# Patient Record
Sex: MALE | Race: WHITE | Employment: OTHER | URBAN - METROPOLITAN AREA
[De-identification: names, ages, dates, MRNs, and addresses within clinical notes are randomized per-mention and may not be internally consistent; named-entity substitution may affect disease eponyms.]

---

## 2020-11-17 ENCOUNTER — PROCEDURE VISIT (OUTPATIENT)
Dept: FAMILY MEDICINE CLINIC | Age: 65
End: 2020-11-17
Payer: MEDICARE

## 2020-11-17 VITALS
TEMPERATURE: 97.5 F | BODY MASS INDEX: 32.47 KG/M2 | HEIGHT: 73 IN | DIASTOLIC BLOOD PRESSURE: 100 MMHG | SYSTOLIC BLOOD PRESSURE: 164 MMHG | WEIGHT: 245 LBS | HEART RATE: 87 BPM

## 2020-11-17 PROCEDURE — 69210 REMOVE IMPACTED EAR WAX UNI: CPT | Performed by: INTERNAL MEDICINE

## 2020-11-17 SDOH — ECONOMIC STABILITY: FOOD INSECURITY: WITHIN THE PAST 12 MONTHS, YOU WORRIED THAT YOUR FOOD WOULD RUN OUT BEFORE YOU GOT MONEY TO BUY MORE.: NEVER TRUE

## 2020-11-17 SDOH — ECONOMIC STABILITY: INCOME INSECURITY: HOW HARD IS IT FOR YOU TO PAY FOR THE VERY BASICS LIKE FOOD, HOUSING, MEDICAL CARE, AND HEATING?: NOT HARD AT ALL

## 2020-11-17 SDOH — ECONOMIC STABILITY: TRANSPORTATION INSECURITY
IN THE PAST 12 MONTHS, HAS THE LACK OF TRANSPORTATION KEPT YOU FROM MEDICAL APPOINTMENTS OR FROM GETTING MEDICATIONS?: NO

## 2020-11-17 SDOH — ECONOMIC STABILITY: FOOD INSECURITY: WITHIN THE PAST 12 MONTHS, THE FOOD YOU BOUGHT JUST DIDN'T LAST AND YOU DIDN'T HAVE MONEY TO GET MORE.: NEVER TRUE

## 2020-11-17 SDOH — ECONOMIC STABILITY: TRANSPORTATION INSECURITY
IN THE PAST 12 MONTHS, HAS LACK OF TRANSPORTATION KEPT YOU FROM MEETINGS, WORK, OR FROM GETTING THINGS NEEDED FOR DAILY LIVING?: NO

## 2020-11-17 ASSESSMENT — ENCOUNTER SYMPTOMS
DIARRHEA: 0
SORE THROAT: 0
RESPIRATORY NEGATIVE: 1
CONSTIPATION: 0
BLOOD IN STOOL: 0
ABDOMINAL PAIN: 0
NAUSEA: 0

## 2020-11-17 ASSESSMENT — PATIENT HEALTH QUESTIONNAIRE - PHQ9
SUM OF ALL RESPONSES TO PHQ QUESTIONS 1-9: 0
SUM OF ALL RESPONSES TO PHQ9 QUESTIONS 1 & 2: 0
SUM OF ALL RESPONSES TO PHQ QUESTIONS 1-9: 0
1. LITTLE INTEREST OR PLEASURE IN DOING THINGS: 0
SUM OF ALL RESPONSES TO PHQ QUESTIONS 1-9: 0
2. FEELING DOWN, DEPRESSED OR HOPELESS: 0

## 2020-11-17 NOTE — PATIENT INSTRUCTIONS
Survey: You may be receiving a survey from [employer] regarding your visit today. You may get this in the mail, through your MyChart or in your email. Please complete the survey to enable us to provide the highest quality of care to you and your family. Please also, mention our names. If you cannot score us as very good (5 Stars) on any question, please feel free to call the office to discuss how we could have made your experience exceptional.      Thank You! MD Timoteo Vega, [address] [place] [address], [address] [place] [address], [facility]    Gregory Flores CMA      1. Need for influenza vaccination  Lazaro Zepeda was given an influenza vaccine today. - INFLUENZA, QUADV, 3 YRS AND OLDER, IM PF, PREFILL SYR OR SDV, 0.5ML (AFLURIA QUADV, PF)    2. Need for pneumococcal vaccination  Pneumovax 23 given today. - PNEUMOVAX 23 subcutaneous/IM (Pneumococcal polysaccharide vaccine 23-valent >= 1yo)    3. Colon cancer screening  Cologuard Rx given today. - Cologuard (For External Results Only); Future    4. Hearing loss due to cerumen impaction, bilateral  After direct visualization with the otoscope  cerumen impaction was identified in the Bilateral ear/s. The procedure for cerumen removal was discussed with Lazaro Zepeda. The risks were discussed including pain, trauma to the external auditory canal, bleeding, and possible tympanic membrane perforation with loss of hearing. After verbal consent was obtained, Lazaro Zepeda was draped to prevent soiling of his clothing. With the use of a 50 cc syringe fitted with a flexible angiocath,  Bilateral ear/s was irrigated 2 times to displace the cerumen away from the TM. With the use of an otoscope to directly visualize the cerumen a cerumen stylet was used with complete removal of the cerumen. The tympanic membranes appeared normal post procedure.   The external auditory canal appeared normal.  Malvin tolerated the procedure well.  he was instructed to use 2 drops of baby oil in each ear two times a week to prevent ear wax accumulation.    - MO REMOVAL IMPACTED CERUMEN INSTRUMENTATION UNILAT    Return in 2 weeks for BP check.

## 2020-11-17 NOTE — PROGRESS NOTES
Subjective:      Patient ID: Cherylene Cough is a 76 y.o. male. Alfonso Stokes presents for a check up with trouble with hearing. Alfonso Stokes admits to new problems (problems with wax in the ears). Medications were reviewed with Alfonso Stokes, he is not taking medication. Bowels are regular. There has not been rectal bleeding. Alfonso Stokes denies urinary complications, the urine stream is good. Alfonso Stokes denies chest pain and denies increasing shortness of breath.     Past Medical History:  No date: Cardiac murmur unspecified  No date: Esophageal spasm      Comment:  age 44y    Past Surgical History:  No date: TONSILLECTOMY    Social History    Socioeconomic History      Marital status:       Spouse name: Not on file      Number of children: Not on file      Years of education: Not on file      Highest education level: Not on file    Occupational History      Occupation: farmer    Social Needs      Financial resource strain: Not hard at all      Food insecurity        Worry: Never true        Inability: Never true      Transportation needs        Medical: No        Non-medical: No    Tobacco Use      Smoking status: Never Smoker      Smokeless tobacco: Never Used    Substance and Sexual Activity      Alcohol use: No      Drug use: Not on file      Sexual activity: Not on file    Lifestyle      Physical activity        Days per week: Not on file        Minutes per session: Not on file      Stress: Not on file    Relationships      Social connections        Talks on phone: Not on file        Gets together: Not on file        Attends [employer] service: Not on file        Active member of club or organization: Not on file        Attends meetings of clubs or organizations: Not on file        Relationship status: Not on file      Intimate partner violence        Fear of current or ex partner: Not on file        Emotionally abused: Not on file        Physically abused: Not on file        Forced sexual activity: Not on file    Other found for: LABA1C  No results found for: EAG    No results found for: CHOL  No results found for: TRIG  No results found for: HDL  No results found for: LDLCHOLESTEROL, LDLCALC  No results found for: LABVLDL, VLDL  No results found for: CHOLHDLRATIO                      Review of Systems   Constitutional: Negative. HENT: Positive for hearing loss. Negative for congestion, ear pain, postnasal drip, sneezing and sore throat. Eyes: Negative for visual disturbance. Respiratory: Negative. Cardiovascular: Negative for chest pain, palpitations and leg swelling. Gastrointestinal: Negative for abdominal pain, blood in stool, constipation, diarrhea and nausea. Genitourinary: Negative for difficulty urinating, dysuria, frequency and urgency. Musculoskeletal: Negative for arthralgias, joint swelling, myalgias, neck pain and neck stiffness. Skin: Negative. Neurological: Negative for syncope. Psychiatric/Behavioral: Negative. Objective:   Physical Exam  Vitals signs and nursing note reviewed. Constitutional:       Appearance: He is well-developed. HENT:      Head: Atraumatic. Right Ear: There is impacted cerumen. Left Ear: There is impacted cerumen. Eyes:      Conjunctiva/sclera: Conjunctivae normal.   Neck:      Musculoskeletal: Normal range of motion and neck supple. Cardiovascular:      Rate and Rhythm: Normal rate and regular rhythm. Heart sounds: Normal heart sounds. Pulmonary:      Effort: Pulmonary effort is normal.      Breath sounds: Normal breath sounds. Abdominal:      Palpations: Abdomen is soft. Tenderness: There is no abdominal tenderness. Lymphadenopathy:      Cervical: No cervical adenopathy. Skin:     Findings: No rash. Neurological:      Mental Status: He is alert. Psychiatric:         Behavior: Behavior normal.         Thought Content: Thought content normal.         Assessment:       Diagnosis Orders   1.  Hearing loss due to cerumen impaction, bilateral  MD REMOVAL IMPACTED CERUMEN INSTRUMENTATION UNILAT   2. Need for influenza vaccination  INFLUENZA, QUADV, 3 YRS AND OLDER, IM PF, PREFILL SYR OR SDV, 0.5ML (AFLURIA QUADV, PF)   3. Need for pneumococcal vaccination  PNEUMOVAX 23 subcutaneous/IM (Pneumococcal polysaccharide vaccine 23-valent >= 1yo)   4. Colon cancer screening  Cologuard (For External Results Only)           Plan:      1. Need for influenza vaccination  Kennon Brittle was given an influenza vaccine today. - INFLUENZA, QUADV, 3 YRS AND OLDER, IM PF, PREFILL SYR OR SDV, 0.5ML (AFLURIA QUADV, PF)    2. Need for pneumococcal vaccination  Pneumovax 23 given today. - PNEUMOVAX 23 subcutaneous/IM (Pneumococcal polysaccharide vaccine 23-valent >= 1yo)    3. Colon cancer screening  Cologuard Rx given today. - Cologuard (For External Results Only); Future    4. Hearing loss due to cerumen impaction, bilateral  After direct visualization with the otoscope  cerumen impaction was identified in the Bilateral ear/s. The procedure for cerumen removal was discussed with Kennon Brittle. The risks were discussed including pain, trauma to the external auditory canal, bleeding, and possible tympanic membrane perforation with loss of hearing. After verbal consent was obtained, Kennon Brittle was draped to prevent soiling of his clothing. With the use of a 50 cc syringe fitted with a flexible angiocath,  Bilateral ear/s was irrigated 2 times to displace the cerumen away from the TM. With the use of an otoscope to directly visualize the cerumen a cerumen stylet was used with complete removal of the cerumen. The tympanic membranes appeared normal post procedure. The external auditory canal appeared normal.  Malvin tolerated the procedure well.  he was instructed to use 2 drops of baby oil in each ear two times a week to prevent ear wax accumulation.    - MD REMOVAL IMPACTED CERUMEN INSTRUMENTATION UNILAT    Return in 2 weeks for BP check.         Hernan Rose, MD

## 2020-11-18 PROCEDURE — 90732 PPSV23 VACC 2 YRS+ SUBQ/IM: CPT | Performed by: INTERNAL MEDICINE

## 2020-11-18 PROCEDURE — 90686 IIV4 VACC NO PRSV 0.5 ML IM: CPT | Performed by: INTERNAL MEDICINE

## 2020-11-18 PROCEDURE — G0008 ADMIN INFLUENZA VIRUS VAC: HCPCS | Performed by: INTERNAL MEDICINE

## 2020-11-18 PROCEDURE — G0009 ADMIN PNEUMOCOCCAL VACCINE: HCPCS | Performed by: INTERNAL MEDICINE

## 2020-11-18 NOTE — PROGRESS NOTES
After obtaining consent, and per orders of Dr. Glennon Severe, injection of PNU 23 given in Right deltoid by Malcolm Casillas. Patient instructed to remain in clinic for 20 minutes afterwards, and to report any adverse reaction to me immediately.

## 2020-12-01 ENCOUNTER — NURSE ONLY (OUTPATIENT)
Dept: FAMILY MEDICINE CLINIC | Age: 65
End: 2020-12-01

## 2020-12-01 VITALS — SYSTOLIC BLOOD PRESSURE: 130 MMHG | HEART RATE: 80 BPM | DIASTOLIC BLOOD PRESSURE: 70 MMHG

## 2020-12-22 ENCOUNTER — HOSPITAL ENCOUNTER (EMERGENCY)
Age: 65
Discharge: HOME | End: 2020-12-22
Attending: FAMILY MEDICINE
Payer: MEDICARE

## 2020-12-22 VITALS
SYSTOLIC BLOOD PRESSURE: 145 MMHG | TEMPERATURE: 98.5 F | DIASTOLIC BLOOD PRESSURE: 83 MMHG | RESPIRATION RATE: 16 BRPM | BODY MASS INDEX: 31.66 KG/M2 | OXYGEN SATURATION: 96 % | HEART RATE: 97 BPM | WEIGHT: 240 LBS

## 2020-12-22 LAB
ABSOLUTE EOS #: 0 K/UL (ref 0–0.4)
ABSOLUTE IMMATURE GRANULOCYTE: ABNORMAL K/UL (ref 0–0.3)
ABSOLUTE LYMPH #: 0.9 K/UL (ref 1–4.8)
ABSOLUTE MONO #: 0.5 K/UL (ref 0–1)
ANION GAP SERPL CALCULATED.3IONS-SCNC: 11 MMOL/L (ref 9–17)
BASOPHILS # BLD: 0 % (ref 0–2)
BASOPHILS ABSOLUTE: 0 K/UL (ref 0–0.2)
BILIRUBIN URINE: NEGATIVE
BUN BLDV-MCNC: 14 MG/DL (ref 8–23)
BUN/CREAT BLD: 15 (ref 9–20)
CALCIUM SERPL-MCNC: 9.5 MG/DL (ref 8.6–10.4)
CHLORIDE BLD-SCNC: 103 MMOL/L (ref 98–107)
CO2: 25 MMOL/L (ref 20–31)
COLOR: YELLOW
COMMENT UA: ABNORMAL
CREAT SERPL-MCNC: 0.95 MG/DL (ref 0.7–1.2)
DIFFERENTIAL TYPE: YES
EOSINOPHILS RELATIVE PERCENT: 0 % (ref 0–5)
GFR AFRICAN AMERICAN: >60 ML/MIN
GFR NON-AFRICAN AMERICAN: >60 ML/MIN
GFR SERPL CREATININE-BSD FRML MDRD: ABNORMAL ML/MIN/{1.73_M2}
GFR SERPL CREATININE-BSD FRML MDRD: ABNORMAL ML/MIN/{1.73_M2}
GLUCOSE BLD-MCNC: 100 MG/DL (ref 70–99)
GLUCOSE URINE: NEGATIVE
HCT VFR BLD CALC: 41.7 % (ref 41–53)
HEMOGLOBIN: 13.9 G/DL (ref 13.5–17.5)
IMMATURE GRANULOCYTES: ABNORMAL %
KETONES, URINE: ABNORMAL
LEUKOCYTE ESTERASE, URINE: NEGATIVE
LYMPHOCYTES # BLD: 12 % (ref 13–44)
MCH RBC QN AUTO: 30.2 PG (ref 26–34)
MCHC RBC AUTO-ENTMCNC: 33.4 G/DL (ref 31–37)
MCV RBC AUTO: 90.3 FL (ref 80–100)
MONOCYTES # BLD: 7 % (ref 5–9)
NITRITE, URINE: NEGATIVE
NRBC AUTOMATED: ABNORMAL PER 100 WBC
PDW BLD-RTO: 14.6 % (ref 12.1–15.2)
PH UA: 6 (ref 5–8)
PLATELET # BLD: 267 K/UL (ref 140–450)
PLATELET ESTIMATE: ABNORMAL
PMV BLD AUTO: ABNORMAL FL (ref 6–12)
POTASSIUM SERPL-SCNC: 4 MMOL/L (ref 3.7–5.3)
PROTEIN UA: NEGATIVE
RBC # BLD: 4.61 M/UL (ref 4.5–5.9)
RBC # BLD: ABNORMAL 10*6/UL
SEG NEUTROPHILS: 81 % (ref 39–75)
SEGMENTED NEUTROPHILS ABSOLUTE COUNT: 6 K/UL (ref 2.1–6.5)
SODIUM BLD-SCNC: 139 MMOL/L (ref 135–144)
SPECIFIC GRAVITY UA: 1.02 (ref 1–1.03)
TURBIDITY: CLEAR
URINE HGB: NEGATIVE
UROBILINOGEN, URINE: NORMAL
WBC # BLD: 7.5 K/UL (ref 3.5–11)
WBC # BLD: ABNORMAL 10*3/UL

## 2020-12-22 PROCEDURE — 81003 URINALYSIS AUTO W/O SCOPE: CPT

## 2020-12-22 PROCEDURE — 36415 COLL VENOUS BLD VENIPUNCTURE: CPT

## 2020-12-22 PROCEDURE — 80048 BASIC METABOLIC PNL TOTAL CA: CPT

## 2020-12-22 PROCEDURE — 99283 EMERGENCY DEPT VISIT LOW MDM: CPT

## 2020-12-22 PROCEDURE — C9803 HOPD COVID-19 SPEC COLLECT: HCPCS

## 2020-12-22 PROCEDURE — U0003 INFECTIOUS AGENT DETECTION BY NUCLEIC ACID (DNA OR RNA); SEVERE ACUTE RESPIRATORY SYNDROME CORONAVIRUS 2 (SARS-COV-2) (CORONAVIRUS DISEASE [COVID-19]), AMPLIFIED PROBE TECHNIQUE, MAKING USE OF HIGH THROUGHPUT TECHNOLOGIES AS DESCRIBED BY CMS-2020-01-R: HCPCS

## 2020-12-22 PROCEDURE — 85025 COMPLETE CBC W/AUTO DIFF WBC: CPT

## 2020-12-22 ASSESSMENT — ENCOUNTER SYMPTOMS
COUGH: 0
NAUSEA: 0
ABDOMINAL PAIN: 0

## 2020-12-22 NOTE — ED PROVIDER NOTES
[facility] ED  EMERGENCY DEPARTMENT ENCOUNTER      Pt Name: Aleyda Cross  MRN: [id number]  [place] 1955  Date of evaluation: 12/22/2020  Provider: Ana Denise MD    CHIEF COMPLAINT       Chief Complaint   Patient presents with    Insomnia     taking advil PM and not able to sleep - dog got ran over last week and not feeling well since then     Fatigue     been feeling weak in the arms and legs for a week          HISTORY OF PRESENT ILLNESS   (Location/Symptom, Timing/Onset, Context/Setting, Quality, Duration, Modifying Factors, Severity)  Note limiting factors. Aleyda Cross is a 76 y.o. male who presents to the emergency department patient has not felt well for about a week. States that he gets chills but has not had documented fever. No respiratory symptoms. He has had insomnia at night. About a week ago his dog got hit by a car that had anesthetize it. Stated he could not sleep for couple nights because of that. He was going to going to see his regular doctor but they were not available today so he came here. HPI    Nursing Notes were reviewed. REVIEW OF SYSTEMS    (2-9 systems for level 4, 10 or more for level 5)     Review of Systems   Constitutional: Positive for fatigue. Negative for fever. HENT:        Taste intact smell intact   Respiratory: Negative for cough. Gastrointestinal: Negative for abdominal pain and nausea. Genitourinary: Negative for dysuria. Neurological: Positive for weakness. Except as noted above the remainder of the review of systems was reviewed and negative. PAST MEDICAL HISTORY     Past Medical History:   Diagnosis Date    Cardiac murmur unspecified     Esophageal spasm     age 44y         SURGICAL HISTORY       Past Surgical History:   Procedure Laterality Date    TONSILLECTOMY           CURRENT MEDICATIONS       Previous Medications    ASCORBIC ACID (VITAMIN C) 500 MG TABLET    Take 500 mg by mouth daily. 12/22/20 1413 12/22/20 1412 12/22/20 1412 12/22/20 1412 12/22/20 1412 12/22/20 1412 -- 12/22/20 1412   (!) 145/83 98.5 °F (36.9 °C) Oral 97 16 96 %  240 lb (108.9 kg)       Physical Exam  Vitals signs reviewed. Constitutional:       Appearance: He is not ill-appearing. HENT:      Head: Atraumatic. Nose: No congestion. Mouth/Throat:      Mouth: Mucous membranes are moist.      Pharynx: No posterior oropharyngeal erythema. Eyes:      General:         Right eye: No discharge. Left eye: No discharge. Pupils: Pupils are equal, round, and reactive to light. Neck:      Musculoskeletal: Neck supple. No muscular tenderness. Cardiovascular:      Rate and Rhythm: Normal rate and regular rhythm. Heart sounds: Normal heart sounds. No murmur. Pulmonary:      Effort: Pulmonary effort is normal. No respiratory distress. Breath sounds: Normal breath sounds. Abdominal:      General: Abdomen is flat. Palpations: Abdomen is soft. Tenderness: There is no abdominal tenderness. Musculoskeletal:         General: No tenderness or signs of injury. Skin:     General: Skin is warm. Capillary Refill: Capillary refill takes less than 2 seconds. Findings: No lesion or rash. Neurological:      General: No focal deficit present. Mental Status: He is alert and oriented to person, place, and time. Cranial Nerves: No cranial nerve deficit. Motor: No weakness.    Psychiatric:         Mood and Affect: Mood normal.         Behavior: Behavior normal.         DIAGNOSTIC RESULTS     EKG: All EKG's are interpreted by the Emergency Department Physician who either signs or Co-signs this chart in the absence of a cardiologist.        RADIOLOGY:   Non-plain film images such as CT, Ultrasound and MRI are read by the radiologist. Plain radiographic images are visualized and preliminarily interpreted by the emergency physician with the below findings:

## 2020-12-23 ENCOUNTER — TELEPHONE (OUTPATIENT)
Dept: FAMILY MEDICINE CLINIC | Age: 65
End: 2020-12-23

## 2020-12-23 ENCOUNTER — CARE COORDINATION (OUTPATIENT)
Dept: CARE COORDINATION | Age: 65
End: 2020-12-23

## 2020-12-23 NOTE — CARE COORDINATION
This Domenica Carlton contacted the patient by telephone to perform post discharge call. Verified name and  with patient as identifiers. Provided introduction to self, and reason for call due to viral symptoms of infection and/or exposure to COVID-19. Yes       Patient presented to emergency department/flu clinic with complaints of viral symptoms/exposure to COVID. Patient reports symptoms are the same. Due to no new or worsening symptoms the RN CTN/DARLENE was not notified for escalation. Discussed exposure protocols and quarantine with CDC Guidelines    Patient was given an opportunity for questions and concerns. Stay home except to get medical care    Separate yourself from other people and animals in your home    Call ahead before visiting your doctor    Wear a facemask    Cover your coughs and sneezes    Clean your hands often    Avoid sharing personal household items    Clean all high-touch surfaces everyday    Monitor your symptoms  Seek prompt medical attention if your illness is worsening (e.g., difficulty breathing). Before seeking care, call your healthcare provider and tell them that you have, or are being evaluated for, COVID-19. Put on a facemask before you enter the facility. These steps will help the healthcare provider's office to keep other people in the office or waiting room from getting infected or exposed. Ask your healthcare provider to call the local or [facility] health department. Persons who are placed under If you have a medical emergency and need to call 911, notify the dispatch personnel that you have, or are being evaluated for COVID-19. If possible, put on a facemask before emergency medical services arrive. The patient agrees to contact the [facility] exposure line [phone], local health department [place] [facility]: ([phone]) and PCP office for questions related to their healthcare. Author provided contact information for future reference.     Patient  given information for [facility] and agrees to enroll  yes  Patient's preferred e-mail:   Patient's preferred phone number: [phone]  Based on Loop alert triggers, patient will be contacted by nurse care manager for worsening symptoms.

## 2020-12-23 NOTE — CARE COORDINATION
Patient contacted regarding COVID-19 risk and screening. This author contacted the patient by telephone to perform follow-up call. Verified name and  with patient as identifiers. Symptoms reviewed with patient. Patient reports symptoms are the same. Due to no new or worsening symptoms the RN CTN/ACM was not notified for escalation. This author reviewed discharge instructions, medical action plan and red flags such as increased shortness of breath, increasing fever, worsening cough or chest pain with patient who verbalized understanding. Discussed exposure protocols and quarantine with CDC Guidelines    Patient who was given an opportunity for questions and concerns. The patient agrees to contact the [facility] exposure line [phone], local [facility] department [place] [facility]: ([phone])[facility] PCP office for questions related to their healthcare. Author provided contact information for future reference.

## 2020-12-25 LAB — SARS-COV-2, NAA: NOT DETECTED

## 2021-01-04 ENCOUNTER — OFFICE VISIT (OUTPATIENT)
Dept: FAMILY MEDICINE CLINIC | Age: 66
End: 2021-01-04
Payer: MEDICARE

## 2021-01-04 VITALS
TEMPERATURE: 98 F | DIASTOLIC BLOOD PRESSURE: 84 MMHG | HEART RATE: 90 BPM | SYSTOLIC BLOOD PRESSURE: 136 MMHG | BODY MASS INDEX: 31.4 KG/M2 | WEIGHT: 238 LBS

## 2021-01-04 DIAGNOSIS — F41.1 GENERALIZED ANXIETY DISORDER: Primary | ICD-10-CM

## 2021-01-04 DIAGNOSIS — F51.02 ADJUSTMENT INSOMNIA: ICD-10-CM

## 2021-01-04 PROCEDURE — 4040F PNEUMOC VAC/ADMIN/RCVD: CPT | Performed by: INTERNAL MEDICINE

## 2021-01-04 PROCEDURE — G8427 DOCREV CUR MEDS BY ELIG CLIN: HCPCS | Performed by: INTERNAL MEDICINE

## 2021-01-04 PROCEDURE — G8417 CALC BMI ABV UP PARAM F/U: HCPCS | Performed by: INTERNAL MEDICINE

## 2021-01-04 PROCEDURE — 3017F COLORECTAL CA SCREEN DOC REV: CPT | Performed by: INTERNAL MEDICINE

## 2021-01-04 PROCEDURE — 1123F ACP DISCUSS/DSCN MKR DOCD: CPT | Performed by: INTERNAL MEDICINE

## 2021-01-04 PROCEDURE — 1036F TOBACCO NON-USER: CPT | Performed by: INTERNAL MEDICINE

## 2021-01-04 PROCEDURE — 99213 OFFICE O/P EST LOW 20 MIN: CPT | Performed by: INTERNAL MEDICINE

## 2021-01-04 PROCEDURE — G8482 FLU IMMUNIZE ORDER/ADMIN: HCPCS | Performed by: INTERNAL MEDICINE

## 2021-01-04 RX ORDER — SERTRALINE HYDROCHLORIDE 25 MG/1
25 TABLET, FILM COATED ORAL DAILY
Qty: 30 TABLET | Refills: 3 | Status: SHIPPED | OUTPATIENT
Start: 2021-01-04 | End: 2021-05-06 | Stop reason: SDUPTHER

## 2021-01-04 ASSESSMENT — ENCOUNTER SYMPTOMS
RESPIRATORY NEGATIVE: 1
ABDOMINAL PAIN: 0
SORE THROAT: 0
NAUSEA: 0
CONSTIPATION: 0
BLOOD IN STOOL: 0
DIARRHEA: 0

## 2021-01-04 ASSESSMENT — PATIENT HEALTH QUESTIONNAIRE - PHQ9
SUM OF ALL RESPONSES TO PHQ QUESTIONS 1-9: 2
SUM OF ALL RESPONSES TO PHQ QUESTIONS 1-9: 2
1. LITTLE INTEREST OR PLEASURE IN DOING THINGS: 1
SUM OF ALL RESPONSES TO PHQ9 QUESTIONS 1 & 2: 2
SUM OF ALL RESPONSES TO PHQ QUESTIONS 1-9: 2

## 2021-01-04 NOTE — PROGRESS NOTES
Subjective:      Patient ID: Ced Renee is a 76 y.o. male. Ronak Witt complains of not feeling good. He states he is having issues at night, \"it just really gets to me\". Ronak Witt states he has had episodes of this in the past which is usually to chest congestion. Ronak Witt states he went to the ER with these symptoms to include fatigue and a work up was performed (reviewed). COVID was negative. He states he is having trouble sleeping. He has used aleve PM and Melatonin to help him sleep. Ronak Witt is not sure if he is having anxiety. He recently lost a dog that \"tore me up\". This is when the symptoms started. 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 Review of Systems   Constitutional: Positive for fatigue. HENT: Negative for congestion, ear pain, postnasal drip, sneezing and sore throat. Eyes: Negative for visual disturbance. Respiratory: Negative. Cardiovascular: Negative for chest pain, palpitations and leg swelling. Gastrointestinal: Negative for abdominal pain, blood in stool, constipation, diarrhea and nausea. Genitourinary: Negative for difficulty urinating, dysuria, frequency and urgency. Musculoskeletal: Negative for arthralgias, joint swelling, myalgias, neck pain and neck stiffness. Skin: Negative. Neurological: Negative for syncope. Psychiatric/Behavioral: The patient is nervous/anxious. Insomnia       Objective:   Physical Exam  Vitals signs and nursing note reviewed. Constitutional:       Appearance: He is well-developed. HENT:      Head: Atraumatic. Eyes:      Conjunctiva/sclera: Conjunctivae normal.   Neck:      Musculoskeletal: Normal range of motion and neck supple. Cardiovascular:      Rate and Rhythm: Normal rate and regular rhythm. Heart sounds: Normal heart sounds. Pulmonary:      Effort: Pulmonary effort is normal.      Breath sounds: Normal breath sounds.    Abdominal: Palpations: Abdomen is soft. Tenderness: There is no abdominal tenderness. Lymphadenopathy:      Cervical: No cervical adenopathy. Skin:     Findings: No rash. Neurological:      Mental Status: He is alert. Psychiatric:         Behavior: Behavior normal.         Thought Content: Thought content normal.         Assessment:       Diagnosis Orders   1. Generalized anxiety disorder  sertraline (ZOLOFT) 25 MG tablet   2. Adjustment insomnia  sertraline (ZOLOFT) 25 MG tablet             Plan:        1. Generalized anxiety disorder  Take Zoloft 25 mg daily. Return in 1 month for recheck. - sertraline (ZOLOFT) 25 MG tablet; Take 1 tablet by mouth daily  Dispense: 30 tablet; Refill: 3    2. Adjustment insomnia  Should improve once the Zoloft starts working.  - sertraline (ZOLOFT) 25 MG tablet; Take 1 tablet by mouth daily  Dispense: 30 tablet; Refill: 3    Heidy Christensen was instructed to follow up in the clinic in 1 months for follow up on his current condition and the response to change in  Medication.                   Yoli St MD

## 2021-01-04 NOTE — PATIENT INSTRUCTIONS
Survey: You may be receiving a survey from [employer] regarding your visit today. You may get this in the mail, through your MyChart or in your email. Please complete the survey to enable us to provide the highest quality of care to you and your family. Please also, mention our names. If you cannot score us as very good (5 Stars) on any question, please feel free to call the office to discuss how we could have made your experience exceptional.      Thank You! MD Ulices Dial [facility], [address] [place] [address], [address] [place] [address], [facility]    Chiquita Washburn [facility]      1. Generalized anxiety disorder  Take Zoloft 25 mg daily. Return in 1 month for recheck. - sertraline (ZOLOFT) 25 MG tablet; Take 1 tablet by mouth daily  Dispense: 30 tablet; Refill: 3    2. Adjustment insomnia  Should improve once the Zoloft starts working.  - sertraline (ZOLOFT) 25 MG tablet; Take 1 tablet by mouth daily  Dispense: 30 tablet; Refill: 3    Omar Alin was instructed to follow up in the clinic in 1 months for follow up on his current condition and the response to change in  Medication.

## 2021-02-04 ENCOUNTER — OFFICE VISIT (OUTPATIENT)
Dept: FAMILY MEDICINE CLINIC | Age: 66
End: 2021-02-04
Payer: MEDICARE

## 2021-02-04 VITALS
SYSTOLIC BLOOD PRESSURE: 139 MMHG | BODY MASS INDEX: 30.88 KG/M2 | DIASTOLIC BLOOD PRESSURE: 82 MMHG | HEART RATE: 78 BPM | HEIGHT: 73 IN | TEMPERATURE: 97.5 F | WEIGHT: 233 LBS

## 2021-02-04 DIAGNOSIS — F41.1 GENERALIZED ANXIETY DISORDER: Primary | ICD-10-CM

## 2021-02-04 PROCEDURE — 4040F PNEUMOC VAC/ADMIN/RCVD: CPT | Performed by: INTERNAL MEDICINE

## 2021-02-04 PROCEDURE — 3017F COLORECTAL CA SCREEN DOC REV: CPT | Performed by: INTERNAL MEDICINE

## 2021-02-04 PROCEDURE — 1036F TOBACCO NON-USER: CPT | Performed by: INTERNAL MEDICINE

## 2021-02-04 PROCEDURE — 99213 OFFICE O/P EST LOW 20 MIN: CPT | Performed by: INTERNAL MEDICINE

## 2021-02-04 PROCEDURE — G8482 FLU IMMUNIZE ORDER/ADMIN: HCPCS | Performed by: INTERNAL MEDICINE

## 2021-02-04 PROCEDURE — G8417 CALC BMI ABV UP PARAM F/U: HCPCS | Performed by: INTERNAL MEDICINE

## 2021-02-04 PROCEDURE — G8427 DOCREV CUR MEDS BY ELIG CLIN: HCPCS | Performed by: INTERNAL MEDICINE

## 2021-02-04 PROCEDURE — 1123F ACP DISCUSS/DSCN MKR DOCD: CPT | Performed by: INTERNAL MEDICINE

## 2021-02-04 ASSESSMENT — ENCOUNTER SYMPTOMS
RESPIRATORY NEGATIVE: 1
BLOOD IN STOOL: 0
DIARRHEA: 0
ABDOMINAL PAIN: 0
SORE THROAT: 0
CONSTIPATION: 0
NAUSEA: 0

## 2021-02-04 NOTE — PROGRESS NOTES
Sofía Aleman (:  1955) is a 76 y.o. male,Established patient, here for evaluation of the following chief complaint(s):  Mental Health Problem (20 started on zoloft with good results.), Insomnia (sleeping better since starting on zoloft.), and Health Maintenance (cologuard at home)      ASSESSMENT/PLAN:   Diagnosis Orders   1. Generalized anxiety disorder       Plan:  1. Generalized anxiety disorder  Controlled on Zoloft 25 mg daily. Sofía Aleman was instructed to follow up in the clinic in 3 months for check up or as needed with any medical issues. SUBJECTIVE/OBJECTIVE:  Abe Belle was started on Zoloft and states he started to do well within a week. Since then he has continued to improve. He is back to sleeping well. Abe Belle states he is much more relaxed and even finds himself taking naps on occasion. He no longer is feeling over whelmed. Abe Belle was able to get him a blue  pup after loosing his other dog. Review of Systems   Constitutional: Negative. HENT: Negative for congestion, ear pain, postnasal drip, sneezing and sore throat. Eyes: Negative for visual disturbance. Respiratory: Negative. Cardiovascular: Negative for chest pain, palpitations and leg swelling. Gastrointestinal: Negative for abdominal pain, blood in stool, constipation, diarrhea and nausea. Genitourinary: Negative for difficulty urinating, dysuria, frequency and urgency. Musculoskeletal: Negative for arthralgias, joint swelling, myalgias, neck pain and neck stiffness. Skin: Negative. Neurological: Negative for syncope. Psychiatric/Behavioral: Negative. Physical Exam  Vitals signs and nursing note reviewed. Constitutional:       Appearance: He is well-developed. HENT:      Head: Atraumatic. Eyes:      Conjunctiva/sclera: Conjunctivae normal.   Neck:      Musculoskeletal: Normal range of motion and neck supple.    Cardiovascular: Rate and Rhythm: Normal rate and regular rhythm. Heart sounds: Normal heart sounds. Pulmonary:      Effort: Pulmonary effort is normal.      Breath sounds: Normal breath sounds. Abdominal:      Palpations: Abdomen is soft. Tenderness: There is no abdominal tenderness. Lymphadenopathy:      Cervical: No cervical adenopathy. Skin:     Findings: No rash. Neurological:      Mental Status: He is alert. Psychiatric:         Behavior: Behavior normal.         Thought Content: Thought content normal.                 An electronic signature was used to authenticate this note.     --Facundo Wilson MD

## 2021-02-04 NOTE — PATIENT INSTRUCTIONS
Survey: You may be receiving a survey from [employer] regarding your visit today. You may get this in the mail, through your MyChart or in your email. Please complete the survey to enable us to provide the highest quality of care to you and your family. Please also, mention our names. If you cannot score us as very good (5 Stars) on any question, please feel free to call the office to discuss how we could have made your experience exceptional.      Thank You! MD Kian Levy [facility], [address] [place] [address], [address] [place] [address], [facility]    Iker Jenkins CMA      1. Generalized anxiety disorder  Controlled on Zoloft 25 mg daily. Varinder Gamboa was instructed to follow up in the clinic in 3 months for check up or as needed with any medical issues.

## 2021-03-02 ENCOUNTER — OFFICE VISIT (OUTPATIENT)
Dept: FAMILY MEDICINE CLINIC | Age: 66
End: 2021-03-02
Payer: MEDICARE

## 2021-03-02 VITALS
HEIGHT: 73 IN | BODY MASS INDEX: 32.07 KG/M2 | SYSTOLIC BLOOD PRESSURE: 128 MMHG | WEIGHT: 242 LBS | DIASTOLIC BLOOD PRESSURE: 70 MMHG | HEART RATE: 85 BPM

## 2021-03-02 DIAGNOSIS — Z00.00 ROUTINE GENERAL MEDICAL EXAMINATION AT A HEALTH CARE FACILITY: Primary | ICD-10-CM

## 2021-03-02 DIAGNOSIS — Z78.9 FULL CODE STATUS: ICD-10-CM

## 2021-03-02 PROCEDURE — 4040F PNEUMOC VAC/ADMIN/RCVD: CPT | Performed by: INTERNAL MEDICINE

## 2021-03-02 PROCEDURE — G0438 PPPS, INITIAL VISIT: HCPCS | Performed by: INTERNAL MEDICINE

## 2021-03-02 PROCEDURE — 3017F COLORECTAL CA SCREEN DOC REV: CPT | Performed by: INTERNAL MEDICINE

## 2021-03-02 PROCEDURE — 1123F ACP DISCUSS/DSCN MKR DOCD: CPT | Performed by: INTERNAL MEDICINE

## 2021-03-02 PROCEDURE — G8482 FLU IMMUNIZE ORDER/ADMIN: HCPCS | Performed by: INTERNAL MEDICINE

## 2021-03-02 ASSESSMENT — PATIENT HEALTH QUESTIONNAIRE - PHQ9
SUM OF ALL RESPONSES TO PHQ QUESTIONS 1-9: 0
SUM OF ALL RESPONSES TO PHQ9 QUESTIONS 1 & 2: 0
2. FEELING DOWN, DEPRESSED OR HOPELESS: 0
1. LITTLE INTEREST OR PLEASURE IN DOING THINGS: 0
SUM OF ALL RESPONSES TO PHQ QUESTIONS 1-9: 0
SUM OF ALL RESPONSES TO PHQ QUESTIONS 1-9: 0

## 2021-03-02 ASSESSMENT — LIFESTYLE VARIABLES: HOW OFTEN DO YOU HAVE A DRINK CONTAINING ALCOHOL: 0

## 2021-03-02 NOTE — PROGRESS NOTES
Medicare Annual Wellness Visit  Name: Saurabh Zacarias Date: 3/2/2021   MRN: [id number] Sex: Male   Age: 76 y.o. Ethnicity: Non-/Non    : 1955 Race: Deb Beltran is here for Beckie Begum AWV and Health Maintenance (items reviewed)    Screenings for behavioral, psychosocial and functional/safety risks, and cognitive dysfunction are all negative except as indicated below. These results, as well as other patient data from the [address] form, are documented in Flowsheets linked to this Encounter. No Known Allergies    Prior to Visit Medications    Medication Sig Taking? Authorizing Provider   sertraline (ZOLOFT) 25 MG tablet Take 1 tablet by mouth daily  Hernan Rose MD   aspirin EC 81 MG EC tablet Take 1 tablet by mouth daily  Hernan Rose MD   Potassium (POTASSIMIN PO) Take by mouth daily  Historical Provider, MD   therapeutic multivitamin-minerals (THERAGRAN-M) tablet Take 1 tablet by mouth daily. Historical Provider, MD   Ascorbic Acid (VITAMIN C) 500 MG tablet Take 500 mg by mouth daily. Historical Provider, MD [place]-Glucosamine-Vit D (GLUCOSAMINE COMPLEX PO) Take  by mouth. Historical Provider, MD       Past Medical History:   Diagnosis Date    Cardiac murmur unspecified     Esophageal spasm     age 44y       Past Surgical History:   Procedure Laterality Date    TONSILLECTOMY         No family history on file. CareTeam (Including outside providers/suppliers regularly involved in providing care):   Patient Care Team:  Soni Leger MD as PCP - General (Internal Medicine)  Soni Leger MD as PCP - [facility] Empaneled Provider    Wt Readings from Last 3 Encounters:   21 242 lb (109.8 kg)   21 233 lb (105.7 kg)   21 238 lb (108 kg)     Vitals:    21 1300   BP: 128/70   Site: Right Upper Arm   Position: Sitting   Cuff Size: Large Adult   Pulse: 85   Weight: 242 lb (109.8 kg)   Height: 6' 1\" (1.854 m)     Body mass index is 31.93 kg/m². Based upon direct observation of the patient, evaluation of cognition reveals recent and remote memory intact. Patient's complete Health Risk Assessment and screening values have been reviewed and are found in Flowsheets. The following problems were reviewed today and where indicated follow up appointments were made and/or referrals ordered. Positive Risk Factor Screenings with Interventions:          General Health and ACP:  General  In general, how would you say your health is?: Very Good  In the past 7 days, have you experienced any of the following?  New or Increased Pain, New or Increased Fatigue, Loneliness, Social Isolation, Stress or Anger?: None of These  Do you get the social and emotional support that you need?: Yes  Do you have a Living Will?: Yes  Advance Directives     Power of [address] [place] [address] Will ACP-Advance Directive ACP-Power of     Not on File Not on File Not on File Not on File      General Health Risk Interventions:  · no concerns at this time    Health Habits/Nutrition:  Health Habits/Nutrition  Do you exercise for at least 20 minutes 2-3 times per week?: Yes  Have you lost any weight without trying in the past 3 months?: No  Do you eat only one meal per day?: No  Have you seen the dentist within the past year?: (!) No  Body mass index: (!) 31.92  Health Habits/Nutrition Interventions:  · Dental exam overdue:  patient encouraged to make appointment with his/her dentist    Hearing/Vision:  No exam data present  Hearing/Vision  Do you or your family notice any trouble with your hearing that hasn't been managed with hearing aids?: No  Do you have difficulty driving, watching TV, or doing any of your daily activities because of your eyesight?: No  Have you had an eye exam within the past year?: (!) No  Hearing/Vision Interventions:  · Hearing concerns:  recommended to schedule eye exam    Safety:  Safety  Do you have working smoke detectors?: Yes Have all throw rugs been removed or fastened?: (!) No  Do you have non-slip mats or surfaces in all bathtubs/showers?: (!) No  Do all of your stairways have a railing or banister?: Yes  Are your doorways, halls and stairs free of clutter?: Yes  Do you always fasten your seatbelt when you are in a car?: Yes  Safety Interventions:  · Home safety tips provided     Personalized Preventive Plan   Current Health Maintenance Status  Immunization History   Administered Date(s) Administered    Influenza 11/01/2013    Influenza Virus Vaccine 11/25/2014, 10/30/2015    Influenza, Quadv, IM, PF (6 mo and older Fluzone, Flulaval, Fluarix, and 3 yrs and older Afluria) 11/18/2020    Pneumococcal Polysaccharide (Misifexhz65) 11/18/2020    Zoster Live (Zostavax) 01/04/2018        Health Maintenance   Topic Date Due    Hepatitis C screen  Never done    COVID-19 Vaccine (1 of 2) Never done    DTaP/Tdap/Td vaccine (1 - Tdap) Never done    Lipid screen  Never done    Colon cancer screen colonoscopy  Never done    Shingles Vaccine (2 of 3) 03/01/2018    Annual Wellness Visit (AWV)  Never done    Flu vaccine  Completed    Pneumococcal 65+ years Vaccine  Completed    Hepatitis A vaccine  Aged Out    Hepatitis B vaccine  Aged Out    Hib vaccine  Aged Out    Meningococcal (ACWY) vaccine  Aged Out     Recommendations for [employer] Due: see orders and patient instructions/AVS.  . Recommended screening schedule for the next 5-10 years is provided to the patient in written form: see Patient Instructions/AVS.    hTea CHRISTINE LPN, 2[birth year], performed the documented evaluation under the direct supervision of the attending physician. Diagnosis Orders   1. Routine general medical examination at a health care facility     2. Full code status  Full code       1. Routine general medical examination at a health care facility  See above discussion and concerns. This encounter was performed under Hernan cavazos MDs, direct supervision, 3/2/2021. Advance Care Planning     General Advance Care Planning (ACP) Conversation    Date of Conversation: 3/2/2021  Conducted with: Patient with Decision Making Capacity    Healthcare Decision Maker:      Primary Decision Maker: Ricardo Martin - Spouse - [phone]    Secondary Decision Maker: Micaela Monge - Child - [phone]    Click here to complete [address] including selection of the Healthcare Decision Maker Relationship (ie \"Primary\")  Today we documented Decision Maker(s) consistent with ACP documents on file. Content/Action Overview:   Has ACP document(s) NOT on file - requested patient to provide  Reviewed DNR/DNI and patient elects Full Code (Attempt Resuscitation)  Full code  order pended    Length of Voluntary ACP Conversation in minutes:  30 minutes    Radames Vaca

## 2021-03-02 NOTE — PATIENT INSTRUCTIONS
Survey: You may be receiving a survey from [employer] regarding your visit today. You may get this in the mail, through your MyChart or in your email. Please complete the survey to enable us to provide the highest quality of care to you and your family. Please also, mention our names. If you cannot score us as very good (5 Stars) on any question, please feel free to call the office to discuss how we could have made your experience exceptional.      Thank You! MD Mario Alberto Mclain, YINKA Gallardo, VALDEMAR Carrillo RN        Personalized Preventive Plan for Suzy Kamara - 3/2/2021  Medicare offers a range of preventive health benefits. Some of the tests and screenings are paid in full while other may be subject to a deductible, co-insurance, and/or copay. Some of these benefits include a comprehensive review of your medical history including lifestyle, illnesses that may run in your family, and various assessments and screenings as appropriate. After reviewing your medical record and screening and assessments performed today your provider may have ordered immunizations, labs, imaging, and/or referrals for you. A list of these orders (if applicable) as well as your Preventive Care list are included within your After Visit Summary for your review. Other Preventive Recommendations:    · A preventive eye exam performed by an eye specialist is recommended every 1-2 years to screen for glaucoma; cataracts, macular degeneration, and other eye disorders. · A preventive dental visit is recommended every 6 months. · Try to get at least 150 minutes of exercise per week or 10,000 steps per day on a pedometer . · Order or download the FREE \"Exercise & Physical Activity: Your Everyday Guide\" from [employer]. Call 1-762.745.7794 or search [employer] online. · You need 5043-9849 mg of calcium and 4421-0123 IU of vitamin D per day. It is possible to meet your calcium requirement with diet alone, but a vitamin D supplement is usually necessary to meet this goal.  · When exposed to the sun, use a sunscreen that protects against both UVA and UVB radiation with an SPF of 30 or greater. Reapply every 2 to 3 hours or after sweating, drying off with a towel, or swimming. · Always wear a seat belt when traveling in a car. Always wear a helmet when riding a bicycle or motorcycle. Personalized Preventive Plan for Suzy Kamara - 3/2/2021  Medicare offers a range of preventive health benefits. Some of the tests and screenings are paid in full while other may be subject to a deductible, co-insurance, and/or copay. Some of these benefits include a comprehensive review of your medical history including lifestyle, illnesses that may run in your family, and various assessments and screenings as appropriate. After reviewing your medical record and screening and assessments performed today your provider may have ordered immunizations, labs, imaging, and/or referrals for you. A list of these orders (if applicable) as well as your Preventive Care list are included within your After Visit Summary for your review. Other Preventive Recommendations:    A preventive eye exam performed by an eye specialist is recommended every 1-2 years to screen for glaucoma; cataracts, macular degeneration, and other eye disorders. A preventive dental visit is recommended every 6 months. Try to get at least 150 minutes of exercise per week or 10,000 steps per day on a pedometer . Order or download the FREE \"Exercise & Physical Activity: Your Everyday Guide\" from [employer]. Call 9-184.581.1405 or search [employer] online. You need 3470-3271 mg of calcium and 4205-7383 IU of vitamin D per day. It is possible to meet your calcium requirement with diet alone, but a vitamin D supplement is usually necessary to meet this goal.  When exposed to the sun, use a sunscreen that protects against both UVA and UVB radiation with an SPF of 30 or greater. Reapply every 2 to 3 hours or after sweating, drying off with a towel, or swimming. Always wear a seat belt when traveling in a car. Always wear a helmet when riding a bicycle or motorcycle.

## 2021-05-06 DIAGNOSIS — F41.1 GENERALIZED ANXIETY DISORDER: ICD-10-CM

## 2021-05-06 DIAGNOSIS — F51.02 ADJUSTMENT INSOMNIA: ICD-10-CM

## 2021-05-06 RX ORDER — SERTRALINE HYDROCHLORIDE 25 MG/1
25 TABLET, FILM COATED ORAL DAILY
Qty: 30 TABLET | Refills: 3 | Status: SHIPPED | OUTPATIENT
Start: 2021-05-06 | End: 2021-08-31 | Stop reason: SDUPTHER

## 2021-05-06 NOTE — TELEPHONE ENCOUNTER
Health Maintenance   Topic Date Due    Hepatitis C screen  Never done    COVID-19 Vaccine (1) Never done    DTaP/Tdap/Td vaccine (1 - Tdap) Never done    Lipid screen  Never done    Colon cancer screen colonoscopy  Never done    Shingles Vaccine (2 of 3) 03/01/2018    Annual Wellness Visit (AWV)  03/03/2022    Flu vaccine  Completed    Pneumococcal 65+ years Vaccine  Completed    Hepatitis A vaccine  Aged Out    Hepatitis B vaccine  Aged Out    Hib vaccine  Aged Out    Meningococcal (ACWY) vaccine  Aged Out             (applicable per patient's age: Cancer Screenings, Depression Screening, Fall Risk Screening, Immunizations)    AST (U/L)   Date Value   07/25/2015 10     ALT (U/L)   Date Value   07/25/2015 13     BUN (mg/dL)   Date Value   12/22/2020 14      (goal A1C is < 7)   (goal LDL is <100) need 30-50% reduction from baseline     BP Readings from Last 3 Encounters:   03/02/21 128/70   02/04/21 139/82   01/04/21 136/84    (goal /80)      All Future Testing planned in [facility]:  Lab Frequency Next Occurrence   Cologuard (For External Results Only) Once 04/30/2021       Next Visit Date:  Future Appointments   Date Time Provider Ashok Juarez   5/14/2021  9:30 AM [facility]   3/8/2022  9:00 AM [facility]            There is no problem list on file for this patient.

## 2021-05-14 ENCOUNTER — OFFICE VISIT (OUTPATIENT)
Dept: FAMILY MEDICINE CLINIC | Age: 66
End: 2021-05-14
Payer: MEDICARE

## 2021-05-14 VITALS
BODY MASS INDEX: 32.07 KG/M2 | TEMPERATURE: 97.5 F | HEIGHT: 73 IN | SYSTOLIC BLOOD PRESSURE: 128 MMHG | HEART RATE: 67 BPM | WEIGHT: 242 LBS | DIASTOLIC BLOOD PRESSURE: 80 MMHG

## 2021-05-14 DIAGNOSIS — F41.1 GENERALIZED ANXIETY DISORDER: Primary | ICD-10-CM

## 2021-05-14 PROCEDURE — G8427 DOCREV CUR MEDS BY ELIG CLIN: HCPCS | Performed by: INTERNAL MEDICINE

## 2021-05-14 PROCEDURE — 3017F COLORECTAL CA SCREEN DOC REV: CPT | Performed by: INTERNAL MEDICINE

## 2021-05-14 PROCEDURE — 4040F PNEUMOC VAC/ADMIN/RCVD: CPT | Performed by: INTERNAL MEDICINE

## 2021-05-14 PROCEDURE — 1123F ACP DISCUSS/DSCN MKR DOCD: CPT | Performed by: INTERNAL MEDICINE

## 2021-05-14 PROCEDURE — G8417 CALC BMI ABV UP PARAM F/U: HCPCS | Performed by: INTERNAL MEDICINE

## 2021-05-14 PROCEDURE — 1036F TOBACCO NON-USER: CPT | Performed by: INTERNAL MEDICINE

## 2021-05-14 PROCEDURE — 99213 OFFICE O/P EST LOW 20 MIN: CPT | Performed by: INTERNAL MEDICINE

## 2021-05-14 ASSESSMENT — ENCOUNTER SYMPTOMS
DIARRHEA: 0
SORE THROAT: 0
BLOOD IN STOOL: 0
NAUSEA: 0
CONSTIPATION: 0
ABDOMINAL PAIN: 0
RESPIRATORY NEGATIVE: 1

## 2021-05-14 NOTE — PATIENT INSTRUCTIONS
Survey: You may be receiving a survey from [employer] regarding your visit today. You may get this in the mail, through your MyChart or in your email. Please complete the survey to enable us to provide the highest quality of care to you and your family. Please also, mention our names. If you cannot score us as very good (5 Stars) on any question, please feel free to call the office to discuss how we could have made your experience exceptional.      Thank You! MD Luba Stuart, YINKA Gallardo, Jen Escamilla, MARTAN RN    [facility], [address] [place] [address]      1. Generalized anxiety disorder  Well controlled on Zoloft. Pelon Saadia was instructed to follow up in the clinic in 6 months for check up or as needed with any medical issues.

## 2021-05-14 NOTE — PROGRESS NOTES
Gallo Garner (:  1955) is a 76 y.o. male,Established patient, here for evaluation of the following chief complaint(s):  Check-Up (Doing well ) and Health Maintenance (items reviewed, needs labs)      ASSESSMENT/PLAN:  1. Generalized anxiety disorder      Plan:  1. Generalized anxiety disorder  Well controlled on Zoloft. Jeaneth Arriaga was instructed to follow up in the clinic in 6 months for check up or as needed with any medical issues. SUBJECTIVE/OBJECTIVE:  Jeaneth Arriaga presents for a check up on his medical conditions Depression / anxiety. Jeaneth Arriaga denies new problems. Medications were reviewed with Jeaneth Arriaga, he is  tolerating the medication. Bowels are regular. There has not been rectal bleeding. Jeaneth Arriaga denies urinary complications, the urine stream is good. Jeaneth Arriaga denies chest pain and denies increasing shortness of breath. Depression / anxiety is well controlled on the current dose of Zoloft. Jeaneth Arriaga states the 2nd COVID injection caused him to have a lot of symptoms.       Past Medical History:  No date: Cardiac murmur unspecified  No date: Esophageal spasm      Comment:  age 44y    Past Surgical History:  No date: TONSILLECTOMY    Social History    Socioeconomic History      Marital status:       Spouse name: Not on file      Number of children: Not on file      Years of education: Not on file      Highest education level: Not on file    Occupational History      Occupation: farmer    Social Needs      Financial resource strain: Not hard at all      Food insecurity        Worry: Never true        Inability: Never true      Transportation needs        Medical: No        Non-medical: No    Tobacco Use      Smoking status: Never Smoker      Smokeless tobacco: Never Used    Substance and Sexual Activity      Alcohol use: No      Drug use: Not Currently      Sexual activity: Not on file    Lifestyle      Physical activity        Days per week: Not on file        Minutes per session: Not on file      Stress: Not on file    Relationships      Social connections        Talks on phone: Not on file        Gets together: Not on file        Attends Taoism service: Not on file        Active member of club or organization: Not on file        Attends meetings of clubs or organizations: Not on file        Relationship status: Not on file      Intimate partner violence        Fear of current or ex partner: Not on file        Emotionally abused: Not on file        Physically abused: Not on file        Forced sexual activity: Not on file    Other Topics      Concerns:        Not on file    Social History Narrative      Not on file      No family history on file. Current Outpatient Medications on File Prior to Visit:  VITAMIN D PO, Take by mouth, Disp: , Rfl:   sertraline (ZOLOFT) 25 MG tablet, Take 1 tablet by mouth daily, Disp: 30 tablet, Rfl: 3  aspirin EC 81 MG EC tablet, Take 1 tablet by mouth daily, Disp: 30 tablet, Rfl: 3  Potassium (POTASSIMIN PO), Take by mouth daily, Disp: , Rfl:   therapeutic multivitamin-minerals (THERAGRAN-M) tablet, Take 1 tablet by mouth daily. , Disp: , Rfl:   Ascorbic Acid (VITAMIN C) 500 MG tablet, Take 500 mg by mouth daily. , Disp: , Rfl:   Boswellia-Glucosamine-Vit D (GLUCOSAMINE COMPLEX PO), Take  by mouth., Disp: , Rfl:     No current facility-administered medications on file prior to visit.        No Known Allergies      Lab Results       Component                Value               Date                       NA                       139                 12/22/2020                 K                        4.0                 12/22/2020                 CL                       103                 12/22/2020                 CO2                      25                  12/22/2020                 BUN                      14                  12/22/2020                 CREATININE               0.95                12/22/2020                 GLUCOSE 100 (H)             12/22/2020                 CALCIUM                  9.5                 12/22/2020                 PROT                     7.1                 07/25/2015                 LABALBU                  4.3                 07/25/2015                 BILITOT                  0.22 (L)            07/25/2015                 ALKPHOS                  58                  07/25/2015                 AST                      10                  07/25/2015                 ALT                      13                  07/25/2015                 LABGLOM                  >60                 12/22/2020                 GFRAA                    >60                 12/22/2020              No results found for: LABA1C  No results found for: EAG    No results found for: CHOL  No results found for: TRIG  No results found for: HDL  No results found for: LDLCHOLESTEROL, LDLCALC  No results found for: LABVLDL, VLDL  No results found for: CHOLHDLRATIO                      Review of Systems   Constitutional: Negative. HENT: Negative for congestion, ear pain, postnasal drip, sneezing and sore throat. Eyes: Negative for visual disturbance. Respiratory: Negative. Cardiovascular: Negative for chest pain, palpitations and leg swelling. Gastrointestinal: Negative for abdominal pain, blood in stool, constipation, diarrhea and nausea. Genitourinary: Negative for difficulty urinating, dysuria, frequency and urgency. Musculoskeletal: Negative for arthralgias, joint swelling, myalgias, neck pain and neck stiffness. Skin: Negative. Neurological: Negative for syncope. Psychiatric/Behavioral: Negative. Physical Exam  Vitals signs and nursing note reviewed. Constitutional:       Appearance: He is well-developed. HENT:      Head: Atraumatic. Eyes:      Conjunctiva/sclera: Conjunctivae normal.   Neck:      Musculoskeletal: Normal range of motion and neck supple.    Cardiovascular:      Rate and Rhythm: Normal rate and regular rhythm. Heart sounds: Normal heart sounds. Pulmonary:      Effort: Pulmonary effort is normal.      Breath sounds: Normal breath sounds. Abdominal:      Palpations: Abdomen is soft. Tenderness: There is no abdominal tenderness. Lymphadenopathy:      Cervical: No cervical adenopathy. Skin:     Findings: No rash. Neurological:      Mental Status: He is alert. Psychiatric:         Behavior: Behavior normal.         Thought Content: Thought content normal.                 An electronic signature was used to authenticate this note.     --Prema Cronin MD

## 2021-08-31 DIAGNOSIS — F51.02 ADJUSTMENT INSOMNIA: ICD-10-CM

## 2021-08-31 DIAGNOSIS — F41.1 GENERALIZED ANXIETY DISORDER: ICD-10-CM

## 2021-08-31 RX ORDER — SERTRALINE HYDROCHLORIDE 25 MG/1
25 TABLET, FILM COATED ORAL DAILY
Qty: 30 TABLET | Refills: 3 | Status: SHIPPED | OUTPATIENT
Start: 2021-08-31 | End: 2022-05-17 | Stop reason: SDUPTHER

## 2021-08-31 NOTE — TELEPHONE ENCOUNTER
Health Maintenance   Topic Date Due    Hepatitis C screen  Never done    DTaP/Tdap/Td vaccine (1 - Tdap) Never done    Lipid screen  Never done    Colon cancer screen colonoscopy  Never done    Shingles Vaccine (2 of 3) 03/01/2018    Flu vaccine (1) 09/01/2021    Annual Wellness Visit (AWV)  03/03/2022    Pneumococcal 65+ years Vaccine  Completed    COVID-19 Vaccine  Completed    Hepatitis A vaccine  Aged Out    Hepatitis B vaccine  Aged Out    Hib vaccine  Aged Out    Meningococcal (ACWY) vaccine  Aged Out             (applicable per patient's age: Cancer Screenings, Depression Screening, Fall Risk Screening, Immunizations)    AST (U/L)   Date Value   07/25/2015 10     ALT (U/L)   Date Value   07/25/2015 13     BUN (mg/dL)   Date Value   12/22/2020 14      (goal A1C is < 7)   (goal LDL is <100) need 30-50% reduction from baseline     BP Readings from Last 3 Encounters:   05/14/21 128/80   03/02/21 128/70   02/04/21 139/82    (goal /80)      All Future Testing planned in [facility]:      Next Visit Date:  Future Appointments   Date Time Provider Ashok Juarez   11/15/2021 10:00 AM MD Rafita Guzman   3/8/2022  9:00 AM MD Kevin Guzman            Patient Active Problem List:     Generalized anxiety disorder

## 2021-11-15 ENCOUNTER — OFFICE VISIT (OUTPATIENT)
Dept: FAMILY MEDICINE CLINIC | Age: 66
End: 2021-11-15
Payer: MEDICARE

## 2021-11-15 VITALS
BODY MASS INDEX: 33 KG/M2 | HEART RATE: 72 BPM | HEIGHT: 73 IN | WEIGHT: 249 LBS | SYSTOLIC BLOOD PRESSURE: 126 MMHG | DIASTOLIC BLOOD PRESSURE: 86 MMHG

## 2021-11-15 DIAGNOSIS — F41.1 GENERALIZED ANXIETY DISORDER: Primary | ICD-10-CM

## 2021-11-15 DIAGNOSIS — Z23 NEED FOR INFLUENZA VACCINATION: ICD-10-CM

## 2021-11-15 PROCEDURE — 90674 CCIIV4 VAC NO PRSV 0.5 ML IM: CPT | Performed by: INTERNAL MEDICINE

## 2021-11-15 PROCEDURE — G8482 FLU IMMUNIZE ORDER/ADMIN: HCPCS | Performed by: INTERNAL MEDICINE

## 2021-11-15 PROCEDURE — G0008 ADMIN INFLUENZA VIRUS VAC: HCPCS | Performed by: INTERNAL MEDICINE

## 2021-11-15 PROCEDURE — 99213 OFFICE O/P EST LOW 20 MIN: CPT | Performed by: INTERNAL MEDICINE

## 2021-11-15 PROCEDURE — G8417 CALC BMI ABV UP PARAM F/U: HCPCS | Performed by: INTERNAL MEDICINE

## 2021-11-15 PROCEDURE — G8427 DOCREV CUR MEDS BY ELIG CLIN: HCPCS | Performed by: INTERNAL MEDICINE

## 2021-11-15 PROCEDURE — 3017F COLORECTAL CA SCREEN DOC REV: CPT | Performed by: INTERNAL MEDICINE

## 2021-11-15 PROCEDURE — 4040F PNEUMOC VAC/ADMIN/RCVD: CPT | Performed by: INTERNAL MEDICINE

## 2021-11-15 PROCEDURE — 1123F ACP DISCUSS/DSCN MKR DOCD: CPT | Performed by: INTERNAL MEDICINE

## 2021-11-15 PROCEDURE — 1036F TOBACCO NON-USER: CPT | Performed by: INTERNAL MEDICINE

## 2021-11-15 ASSESSMENT — ENCOUNTER SYMPTOMS
NAUSEA: 0
CONSTIPATION: 0
ABDOMINAL PAIN: 0
SORE THROAT: 0
RESPIRATORY NEGATIVE: 1
DIARRHEA: 0
BLOOD IN STOOL: 0

## 2021-11-15 NOTE — PROGRESS NOTES
Yodit Tuttle (:  1955) is a 78 y.o. male,Established patient, here for evaluation of the following chief complaint(s):  Mental Health Problem and Flu Vaccine         ASSESSMENT/PLAN:  1. Generalized anxiety disorder  2. Need for influenza vaccination  -     INFLUENZA, MDCK QUADV, 2 YRS AND OLDER, IM, PF, PREFILL SYR OR SDV, 0.5ML (FLUCELVAX QUADV, PF)      Plan:  1. Need for influenza vaccination  Tyler Lake was given an influenza vaccine today. - INFLUENZA, MDCK QUADV, 2 YRS AND OLDER, IM, PF, PREFILL SYR OR SDV, 0.5ML (FLUCELVAX QUADV, PF)    2. Generalized anxiety disorder  Controlled on Zoloft. Tyler Lake was instructed to follow up in the clinic in 6 months for check up or as needed with any medical issues. Subjective   SUBJECTIVE/OBJECTIVE:  Tyler Lake presents for a check up on his medical conditions Anxiety. Tyler Lake denies new problems. Medications were reviewed with Tyler Lake, he is  tolerating the medication. Bowels are regular. There has not been rectal bleeding. Tyler Lake denies urinary complications, the urine stream is good. Tyler Lake denies chest pain and denies increasing shortness of breath. Chalo Dan feels his anxiety is well controlled on Zoloft. He denies depression.      Past Medical History:  No date: Cardiac murmur unspecified  No date: Esophageal spasm      Comment:  age 44y    Past Surgical History:  No date: TONSILLECTOMY    Social History    Socioeconomic History      Marital status:       Spouse name: Not on file      Number of children: Not on file      Years of education: Not on file      Highest education level: Not on file    Occupational History      Occupation: farmer    Tobacco Use      Smoking status: Never Smoker      Smokeless tobacco: Never Used    Substance and Sexual Activity      Alcohol use: No      Drug use: Not Currently      Sexual activity: Not on file    Other Topics      Concerns:        Not on file    Social History Narrative Not on file    Social Determinants of Health  Financial Resource Strain: Low Risk       Difficulty of Paying Living Expenses: Not hard at all  Food Insecurity: No Food Insecurity      Worried About [facility] in the Last Year: Never true      Ran Out of Food in the Last Year: Never true  Transportation Needs: No Transportation Needs      Lack of Transportation (Medical): No      Lack of Transportation (Non-Medical): No  Physical Activity:       Days of Exercise per Week: Not on file      Minutes of Exercise per Session: Not on file  Stress:       Feeling of Stress : Not on file  Social Connections:       Frequency of Communication with Friends and Family: Not on file      Frequency of Social Gatherings with Friends and Family: Not on file      Attends Hinduism Services: Not on file      Active Member of [employer] or Organizations: Not on file      Attends Club or Organization Meetings: Not on file      Marital Status: Not on file  Intimate Partner Violence:       Fear of Current or Ex-Partner: Not on file      Emotionally Abused: Not on file      Physically Abused: Not on file      Sexually Abused: Not on file  Housing Stability:       Unable to Pay for Housing in the Last Year: Not on file      Number of Places Lived in the Last Year: Not on file      Unstable Housing in the Last Year: Not on file    No family history on file. Current Outpatient Medications on File Prior to Visit:  sertraline (ZOLOFT) 25 MG tablet, Take 1 tablet by mouth daily, Disp: 30 tablet, Rfl: 3  VITAMIN D PO, Take by mouth, Disp: , Rfl:   aspirin EC 81 MG EC tablet, Take 1 tablet by mouth daily, Disp: 30 tablet, Rfl: 3  Potassium (POTASSIMIN PO), Take by mouth daily, Disp: , Rfl:   therapeutic multivitamin-minerals (THERAGRAN-M) tablet, Take 1 tablet by mouth daily. , Disp: , Rfl:   Ascorbic Acid (VITAMIN C) 500 MG tablet, Take 500 mg by mouth daily. , Disp: , Rfl:   Boswellia-Glucosamine-Vit D (GLUCOSAMINE COMPLEX PO), Take  by mouth., Disp: , Rfl:     No current facility-administered medications on file prior to visit. No Known Allergies      Lab Results       Component                Value               Date                       NA                       139                 12/22/2020                 K                        4.0                 12/22/2020                 CL                       103                 12/22/2020                 CO2                      25                  12/22/2020                 BUN                      14                  12/22/2020                 CREATININE               0.95                12/22/2020                 GLUCOSE                  100 (H)             12/22/2020                 CALCIUM                  9.5                 12/22/2020                 PROT                     7.1                 07/25/2015                 LABALBU                  4.3                 07/25/2015                 BILITOT                  0.22 (L)            07/25/2015                 ALKPHOS                  58                  07/25/2015                 AST                      10                  07/25/2015                 ALT                      13                  07/25/2015                 LABGLOM                  >60                 12/22/2020                 GFRAA                    >60                 12/22/2020              No results found for: LABA1C  No results found for: EAG    No results found for: CHOL  No results found for: TRIG  No results found for: HDL  No results found for: LDLCHOLESTEROL, LDLCALC  No results found for: LABVLDL, VLDL  No results found for: CHOLHDLRATIO                      Review of Systems   Constitutional: Negative. HENT: Negative for congestion, ear pain, postnasal drip, sneezing and sore throat. Eyes: Negative for visual disturbance. Respiratory: Negative. Cardiovascular: Negative for chest pain, palpitations and leg swelling.    Gastrointestinal: Negative for abdominal pain, blood in stool, constipation, diarrhea and nausea. Genitourinary: Negative for difficulty urinating, dysuria, frequency and urgency. Musculoskeletal: Negative for arthralgias, joint swelling, myalgias, neck pain and neck stiffness. Skin: Negative. Neurological: Negative for syncope. Psychiatric/Behavioral: Negative. Objective   Physical Exam  Vitals and nursing note reviewed. Constitutional:       Appearance: He is well-developed. HENT:      Head: Atraumatic. Eyes:      Conjunctiva/sclera: Conjunctivae normal.   Cardiovascular:      Rate and Rhythm: Normal rate and regular rhythm. Heart sounds: Normal heart sounds. Pulmonary:      Effort: Pulmonary effort is normal.      Breath sounds: Normal breath sounds. Abdominal:      Palpations: Abdomen is soft. Tenderness: There is no abdominal tenderness. Musculoskeletal:      Cervical back: Normal range of motion and neck supple. Lymphadenopathy:      Cervical: No cervical adenopathy. Skin:     Findings: No rash. Neurological:      Mental Status: He is alert. Psychiatric:         Behavior: Behavior normal.         Thought Content: Thought content normal.                  An electronic signature was used to authenticate this note.     --Crissy Munguia MD

## 2021-11-15 NOTE — PATIENT INSTRUCTIONS
Survey: You may be receiving a survey from [employer] regarding your visit today. You may get this in the mail, through your MyChart or in your email. Please complete the survey to enable us to provide the highest quality of care to you and your family. Please also, mention our names. If you cannot score us as very good (5 Stars) on any question, please feel free to call the office to discuss how we could have made your experience exceptional.      Thank You! MD Liza Davis, YINKA Gallardo, Antwan Leon, MARTAN ABELARDO    [facility], [address] [place] [address]      1. Need for influenza vaccination  Saturnino Phipps was given an influenza vaccine today. - INFLUENZA, MDCK QUADV, 2 YRS AND OLDER, IM, PF, PREFILL SYR OR SDV, 0.5ML (FLUCELVAX QUADV, PF)    2. Generalized anxiety disorder  Controlled on Zoloft. Saturnino Phipps was instructed to follow up in the clinic in 6 months for check up or as needed with any medical issues.

## 2022-03-08 ENCOUNTER — OFFICE VISIT (OUTPATIENT)
Dept: FAMILY MEDICINE CLINIC | Age: 67
End: 2022-03-08
Payer: MEDICARE

## 2022-03-08 VITALS
HEIGHT: 73 IN | HEART RATE: 88 BPM | WEIGHT: 249 LBS | BODY MASS INDEX: 33 KG/M2 | DIASTOLIC BLOOD PRESSURE: 80 MMHG | SYSTOLIC BLOOD PRESSURE: 138 MMHG | OXYGEN SATURATION: 98 %

## 2022-03-08 DIAGNOSIS — Z12.11 COLON CANCER SCREENING: ICD-10-CM

## 2022-03-08 DIAGNOSIS — Z00.00 MEDICARE ANNUAL WELLNESS VISIT, SUBSEQUENT: Primary | ICD-10-CM

## 2022-03-08 PROCEDURE — 1123F ACP DISCUSS/DSCN MKR DOCD: CPT | Performed by: INTERNAL MEDICINE

## 2022-03-08 PROCEDURE — G8482 FLU IMMUNIZE ORDER/ADMIN: HCPCS | Performed by: INTERNAL MEDICINE

## 2022-03-08 PROCEDURE — G0439 PPPS, SUBSEQ VISIT: HCPCS | Performed by: INTERNAL MEDICINE

## 2022-03-08 PROCEDURE — 4040F PNEUMOC VAC/ADMIN/RCVD: CPT | Performed by: INTERNAL MEDICINE

## 2022-03-08 PROCEDURE — 3017F COLORECTAL CA SCREEN DOC REV: CPT | Performed by: INTERNAL MEDICINE

## 2022-03-08 SDOH — ECONOMIC STABILITY: FOOD INSECURITY: WITHIN THE PAST 12 MONTHS, THE FOOD YOU BOUGHT JUST DIDN'T LAST AND YOU DIDN'T HAVE MONEY TO GET MORE.: NEVER TRUE

## 2022-03-08 SDOH — ECONOMIC STABILITY: FOOD INSECURITY: WITHIN THE PAST 12 MONTHS, YOU WORRIED THAT YOUR FOOD WOULD RUN OUT BEFORE YOU GOT MONEY TO BUY MORE.: NEVER TRUE

## 2022-03-08 ASSESSMENT — SOCIAL DETERMINANTS OF HEALTH (SDOH): HOW HARD IS IT FOR YOU TO PAY FOR THE VERY BASICS LIKE FOOD, HOUSING, MEDICAL CARE, AND HEATING?: NOT VERY HARD

## 2022-03-08 ASSESSMENT — PATIENT HEALTH QUESTIONNAIRE - PHQ9
SUM OF ALL RESPONSES TO PHQ QUESTIONS 1-9: 0
SUM OF ALL RESPONSES TO PHQ QUESTIONS 1-9: 0
1. LITTLE INTEREST OR PLEASURE IN DOING THINGS: 0
SUM OF ALL RESPONSES TO PHQ QUESTIONS 1-9: 0
SUM OF ALL RESPONSES TO PHQ QUESTIONS 1-9: 0
2. FEELING DOWN, DEPRESSED OR HOPELESS: 0
SUM OF ALL RESPONSES TO PHQ9 QUESTIONS 1 & 2: 0

## 2022-03-08 ASSESSMENT — LIFESTYLE VARIABLES: HOW OFTEN DO YOU HAVE A DRINK CONTAINING ALCOHOL: NEVER

## 2022-03-08 NOTE — PATIENT INSTRUCTIONS
Personalized Preventive Plan for Hannah Murray - 3/8/2022  Medicare offers a range of preventive health benefits. Some of the tests and screenings are paid in full while other may be subject to a deductible, co-insurance, and/or copay. Some of these benefits include a comprehensive review of your medical history including lifestyle, illnesses that may run in your family, and various assessments and screenings as appropriate. After reviewing your medical record and screening and assessments performed today your provider may have ordered immunizations, labs, imaging, and/or referrals for you. A list of these orders (if applicable) as well as your Preventive Care list are included within your After Visit Summary for your review. Other Preventive Recommendations:    · A preventive eye exam performed by an eye specialist is recommended every 1-2 years to screen for glaucoma; cataracts, macular degeneration, and other eye disorders. · A preventive dental visit is recommended every 6 months. · Try to get at least 150 minutes of exercise per week or 10,000 steps per day on a pedometer . · Order or download the FREE \"Exercise & Physical Activity: Your Everyday Guide\" from [employer]. Call 8-785.563.2705 or search [employer] online. · You need 2878-8566 mg of calcium and 2149-1713 IU of vitamin D per day. It is possible to meet your calcium requirement with diet alone, but a vitamin D supplement is usually necessary to meet this goal.  · When exposed to the sun, use a sunscreen that protects against both UVA and UVB radiation with an SPF of 30 or greater. Reapply every 2 to 3 hours or after sweating, drying off with a towel, or swimming. · Always wear a seat belt when traveling in a car. Always wear a helmet when riding a bicycle or motorcycle.

## 2022-04-01 LAB — NONINV COLON CA DNA+OCC BLD SCRN STL QL: NEGATIVE

## 2022-05-17 ENCOUNTER — OFFICE VISIT (OUTPATIENT)
Dept: FAMILY MEDICINE CLINIC | Age: 67
End: 2022-05-17
Payer: MEDICARE

## 2022-05-17 VITALS
HEART RATE: 66 BPM | SYSTOLIC BLOOD PRESSURE: 130 MMHG | WEIGHT: 249 LBS | BODY MASS INDEX: 33 KG/M2 | HEIGHT: 73 IN | DIASTOLIC BLOOD PRESSURE: 76 MMHG

## 2022-05-17 DIAGNOSIS — F41.1 GENERALIZED ANXIETY DISORDER: ICD-10-CM

## 2022-05-17 DIAGNOSIS — F51.02 ADJUSTMENT INSOMNIA: ICD-10-CM

## 2022-05-17 PROCEDURE — 1123F ACP DISCUSS/DSCN MKR DOCD: CPT | Performed by: INTERNAL MEDICINE

## 2022-05-17 PROCEDURE — G8417 CALC BMI ABV UP PARAM F/U: HCPCS | Performed by: INTERNAL MEDICINE

## 2022-05-17 PROCEDURE — 1036F TOBACCO NON-USER: CPT | Performed by: INTERNAL MEDICINE

## 2022-05-17 PROCEDURE — 4040F PNEUMOC VAC/ADMIN/RCVD: CPT | Performed by: INTERNAL MEDICINE

## 2022-05-17 PROCEDURE — 3017F COLORECTAL CA SCREEN DOC REV: CPT | Performed by: INTERNAL MEDICINE

## 2022-05-17 PROCEDURE — G8427 DOCREV CUR MEDS BY ELIG CLIN: HCPCS | Performed by: INTERNAL MEDICINE

## 2022-05-17 PROCEDURE — 99213 OFFICE O/P EST LOW 20 MIN: CPT | Performed by: INTERNAL MEDICINE

## 2022-05-17 RX ORDER — SERTRALINE HYDROCHLORIDE 25 MG/1
25 TABLET, FILM COATED ORAL DAILY
Qty: 30 TABLET | Refills: 5 | Status: SHIPPED | OUTPATIENT
Start: 2022-05-17

## 2022-05-17 ASSESSMENT — ENCOUNTER SYMPTOMS
DIARRHEA: 0
ABDOMINAL PAIN: 0
SORE THROAT: 0
CONSTIPATION: 0
BLOOD IN STOOL: 0
NAUSEA: 0
RESPIRATORY NEGATIVE: 1

## 2022-05-17 NOTE — PATIENT INSTRUCTIONS
Survey: You may be receiving a survey from [employer] regarding your visit today. You may get this in the mail, through your MyChart or in your email. Please complete the survey to enable us to provide the highest quality of care to you and your family. Please also, mention our names. If you cannot score us as very good (5 Stars) on any question, please feel free to call the office to discuss how we could have made your experience exceptional.      Thank You! MD Kami Fletcher, Paul Moreno, BSN RN    [facility], [address] [place] [address]      1. Generalized anxiety disorder  Controlled on Zoloft. No change in treatment. 2. Adjustment insomnia  Stable on Zoloft. Recommend fasting labs. Kay Haq was instructed to follow up in the clinic in 6 months for check up or as needed with any medical issues.

## 2022-05-17 NOTE — PROGRESS NOTES
Kimberlee Mckinnon (:  1955) is a 78 y.o. male,Established patient, here for evaluation of the following chief complaint(s):  Mental Health Problem (6 month check up, Med refill. )         ASSESSMENT/PLAN:  1. Generalized anxiety disorder  The following orders have not been finalized:  -     sertraline (ZOLOFT) 25 MG tablet  2. Adjustment insomnia  The following orders have not been finalized:  -     sertraline (ZOLOFT) 25 MG tablet      Plan:  1. Generalized anxiety disorder  Controlled on Zoloft. No change in treatment. 2. Adjustment insomnia  Stable on Zoloft. Recommend fasting labs. Diann Hays was instructed to follow up in the clinic in 6 months for check up or as needed with any medical issues. Subjective   SUBJECTIVE/OBJECTIVE:  Diann Hays presents for a check up on his medical conditions Depression / anxiety. Diann Hays denies new problems. Medications were reviewed with Diann Hays, he is  tolerating the medication. Bowels are regular. There has not been rectal bleeding. Diann Hays denies urinary complications, the urine stream is good. Diann Hays denies chest pain and denies increasing shortness of breath. Carol Ansari feels Zoloft is working well at the current dose.       Past Medical History:  No date: Cardiac murmur unspecified  No date: Esophageal spasm      Comment:  age 44y    Past Surgical History:  No date: TONSILLECTOMY    Social History    Socioeconomic History      Marital status:       Spouse name: Not on file      Number of children: Not on file      Years of education: Not on file      Highest education level: Not on file    Occupational History      Occupation: farmer    Tobacco Use      Smoking status: Never Smoker      Smokeless tobacco: Never Used    Substance and Sexual Activity      Alcohol use: No      Drug use: Not Currently      Sexual activity: Not on file    Other Topics      Concerns:        Not on file    Social History Narrative      Not on file    Social Determinants of Health  Financial Resource Strain: Low Risk       Difficulty of Paying Living Expenses: Not very hard  Food Insecurity: No Food Insecurity      Worried About Running Out of Food in the Last Year: Never true      Ran Out of Food in the Last Year: Never true  Transportation Needs:       Lack of Transportation (Medical): Not on file      Lack of Transportation (Non-Medical): Not on file  Physical Activity: Inactive      Days of Exercise per Week: 0 days      Minutes of Exercise per Session: 0 min  Stress:       Feeling of Stress : Not on file  Social Connections:       Frequency of Communication with Friends and Family: Not on file      Frequency of Social Gatherings with Friends and Family: Not on file      Attends Zoroastrianism Services: Not on file      Active Member of [address] [place] [address] or Organizations: Not on file      Attends Club or Organization Meetings: Not on file      Marital Status: Not on file  Intimate Partner Violence:       Fear of Current or Ex-Partner: Not on file      Emotionally Abused: Not on file      Physically Abused: Not on file      Sexually Abused: Not on file  Housing Stability:       Unable to Pay for Housing in the Last Year: Not on file      Number of Places Lived in the Last Year: Not on file      Unstable Housing in the Last Year: Not on file    No family history on file.       Current Outpatient Medications on File Prior to Visit:  Omega-3 Fatty Acids (FISH OIL PO), Take by mouth, Disp: , Rfl:   VITAMIN E PO, Take by mouth, Disp: , Rfl:   ZINC PO, Take by mouth, Disp: , Rfl:   Cyanocobalamin (B-12 PO), Take by mouth, Disp: , Rfl:   sertraline (ZOLOFT) 25 MG tablet, Take 1 tablet by mouth daily, Disp: 30 tablet, Rfl: 3  VITAMIN D PO, Take by mouth, Disp: , Rfl:   aspirin EC 81 MG EC tablet, Take 1 tablet by mouth daily, Disp: 30 tablet, Rfl: 3  Potassium (POTASSIMIN PO), Take by mouth daily, Disp: , Rfl:   therapeutic multivitamin-minerals (THERAGRAN-M) tablet, Take 1 tablet by mouth daily. , Disp: , Rfl:   Ascorbic Acid (VITAMIN C) 500 MG tablet, Take 500 mg by mouth daily. , Disp: , Rfl:   Boswellia-Glucosamine-Vit D (GLUCOSAMINE COMPLEX PO), Take  by mouth., Disp: , Rfl:     No current facility-administered medications on file prior to visit. No Known Allergies      Lab Results       Component                Value               Date                       NA                       139                 12/22/2020                 K                        4.0                 12/22/2020                 CL                       103                 12/22/2020                 CO2                      25                  12/22/2020                 BUN                      14                  12/22/2020                 CREATININE               0.95                12/22/2020                 GLUCOSE                  100 (H)             12/22/2020                 CALCIUM                  9.5                 12/22/2020                 PROT                     7.1                 07/25/2015                 LABALBU                  4.3                 07/25/2015                 BILITOT                  0.22 (L)            07/25/2015                 ALKPHOS                  58                  07/25/2015                 AST                      10                  07/25/2015                 ALT                      13                  07/25/2015                 LABGLOM                  >60                 12/22/2020                 GFRAA                    >60                 12/22/2020              No results found for: LABA1C  No results found for: EAG    No results found for: CHOL  No results found for: TRIG  No results found for: HDL  No results found for: LDLCHOLESTEROL, LDLCALC  No results found for: LABVLDL, VLDL  No results found for: CHOLHDLRATIO                      Review of Systems   Constitutional: Negative.     HENT: Negative for congestion, ear pain, postnasal drip, sneezing and sore throat. Eyes: Negative for visual disturbance. Respiratory: Negative. Cardiovascular: Negative for chest pain, palpitations and leg swelling. Gastrointestinal: Negative for abdominal pain, blood in stool, constipation, diarrhea and nausea. Genitourinary: Negative for difficulty urinating, dysuria, frequency and urgency. Musculoskeletal: Negative for arthralgias, joint swelling, myalgias, neck pain and neck stiffness. Skin: Negative. Neurological: Negative for syncope. Psychiatric/Behavioral: Negative. Objective   Physical Exam  Vitals and nursing note reviewed. Constitutional:       Appearance: He is well-developed. HENT:      Head: Atraumatic. Eyes:      Conjunctiva/sclera: Conjunctivae normal.   Cardiovascular:      Rate and Rhythm: Normal rate and regular rhythm. Heart sounds: Normal heart sounds. Pulmonary:      Effort: Pulmonary effort is normal.      Breath sounds: Normal breath sounds. Abdominal:      Palpations: Abdomen is soft. Tenderness: There is no abdominal tenderness. Musculoskeletal:      Cervical back: Normal range of motion and neck supple. Lymphadenopathy:      Cervical: No cervical adenopathy. Skin:     Findings: No rash. Neurological:      Mental Status: He is alert. Psychiatric:         Behavior: Behavior normal.         Thought Content: Thought content normal.                  An electronic signature was used to authenticate this note.     --Kennedi Richey MD

## 2022-11-22 DIAGNOSIS — F41.1 GENERALIZED ANXIETY DISORDER: ICD-10-CM

## 2022-11-22 DIAGNOSIS — F51.02 ADJUSTMENT INSOMNIA: ICD-10-CM

## 2022-11-22 RX ORDER — SERTRALINE HYDROCHLORIDE 25 MG/1
25 TABLET, FILM COATED ORAL DAILY
Qty: 90 TABLET | Refills: 1 | Status: SHIPPED | OUTPATIENT
Start: 2022-11-22

## 2022-12-05 ENCOUNTER — OFFICE VISIT (OUTPATIENT)
Dept: FAMILY MEDICINE CLINIC | Age: 67
End: 2022-12-05
Payer: MEDICARE

## 2022-12-05 VITALS
HEIGHT: 73 IN | SYSTOLIC BLOOD PRESSURE: 136 MMHG | WEIGHT: 255 LBS | BODY MASS INDEX: 33.8 KG/M2 | HEART RATE: 76 BPM | DIASTOLIC BLOOD PRESSURE: 80 MMHG

## 2022-12-05 DIAGNOSIS — F41.1 GENERALIZED ANXIETY DISORDER: Primary | ICD-10-CM

## 2022-12-05 PROCEDURE — G8427 DOCREV CUR MEDS BY ELIG CLIN: HCPCS | Performed by: INTERNAL MEDICINE

## 2022-12-05 PROCEDURE — 3017F COLORECTAL CA SCREEN DOC REV: CPT | Performed by: INTERNAL MEDICINE

## 2022-12-05 PROCEDURE — 1036F TOBACCO NON-USER: CPT | Performed by: INTERNAL MEDICINE

## 2022-12-05 PROCEDURE — G8417 CALC BMI ABV UP PARAM F/U: HCPCS | Performed by: INTERNAL MEDICINE

## 2022-12-05 PROCEDURE — 1123F ACP DISCUSS/DSCN MKR DOCD: CPT | Performed by: INTERNAL MEDICINE

## 2022-12-05 PROCEDURE — 99213 OFFICE O/P EST LOW 20 MIN: CPT | Performed by: INTERNAL MEDICINE

## 2022-12-05 PROCEDURE — G8484 FLU IMMUNIZE NO ADMIN: HCPCS | Performed by: INTERNAL MEDICINE

## 2022-12-05 ASSESSMENT — ENCOUNTER SYMPTOMS
SORE THROAT: 0
RESPIRATORY NEGATIVE: 1
NAUSEA: 0
DIARRHEA: 0
CONSTIPATION: 0
BLOOD IN STOOL: 0
ABDOMINAL PAIN: 0

## 2022-12-05 NOTE — PATIENT INSTRUCTIONS
Survey: You may be receiving a survey from [employer] regarding your visit today. You may get this in the mail, through your MyChart or in your email. Please complete the survey to enable us to provide the highest quality of care to you and your family. Please also, mention our names. If you cannot score us as very good (5 Stars) on any question, please feel free to call the office to discuss how we could have made your experience exceptional.      Thank You! MD Renny Davis [facility], [address] [place] [address], Kingman Colin, BSN ABELARDO Franklin     Plan:  1. Generalized anxiety disorder  Doing well on Zoloft. No change in medication. Recommend fasting labs at the wellness fair. Saurabh Iniguez was instructed to follow up in the clinic in 6 months for check up or as needed with any medical issues.

## 2022-12-05 NOTE — PROGRESS NOTES
Sade Valdez (:  1955) is a 75 y.o. male,Established patient, here for evaluation of the following chief complaint(s):  Mental Health Problem (6 month check up. )         ASSESSMENT/PLAN:  1. Generalized anxiety disorder      Plan:  1. Generalized anxiety disorder  Doing well on Zoloft. No change in medication. Recommend fasting labs at the wellness fair. Mia Layton was instructed to follow up in the clinic in 6 months for check up or as needed with any medical issues. Subjective   SUBJECTIVE/OBJECTIVE:  Mia Layton presents for a check up on his medical conditions Anxiety. Mia Layton denies new problems. Medications were reviewed with Mia Layton, he is  tolerating the medication. Bowels are regular. There has not been rectal bleeding. Mia Layton denies urinary complications, the urine stream is good. Mia Layton denies chest pain and denies increasing shortness of breath. Anxiety has been well controlled on the current dose of Zoloft. Does not feel he needs any change in medication.      Past Medical History:  No date: Cardiac murmur unspecified  No date: Esophageal spasm      Comment:  age 44y    Past Surgical History:  No date: TONSILLECTOMY    Social History    Socioeconomic History      Marital status:       Spouse name: Not on file      Number of children: Not on file      Years of education: Not on file      Highest education level: Not on file    Occupational History      Occupation: farmer    Tobacco Use      Smoking status: Never      Smokeless tobacco: Never    Substance and Sexual Activity      Alcohol use: No      Drug use: Not Currently      Sexual activity: Not on file    Other Topics      Concerns:        Not on file    Social History Narrative      Not on file    Social Determinants of Health  Financial Resource Strain: Low Risk       Difficulty of Paying Living Expenses: Not very hard  Food Insecurity: No Food Insecurity      Worried About Running Out of Food in the Last Year: Never true      Ran Out of Food in the Last Year: Never true  Transportation Needs: Not on file  Physical Activity: Inactive      Days of Exercise per Week: 0 days      Minutes of Exercise per Session: 0 min  Stress: Not on file  Social Connections: Not on file  Intimate Partner Violence: Not on file  Housing Stability: Not on file    No family history on file. Current Outpatient Medications on File Prior to Visit:  sertraline (ZOLOFT) 25 MG tablet, TAKE 1 TABLET BY MOUTH DAILY, Disp: 90 tablet, Rfl: 1  Omega-3 Fatty Acids (FISH OIL PO), Take by mouth, Disp: , Rfl:   VITAMIN E PO, Take by mouth, Disp: , Rfl:   ZINC PO, Take by mouth, Disp: , Rfl:   Cyanocobalamin (B-12 PO), Take by mouth, Disp: , Rfl:   VITAMIN D PO, Take by mouth, Disp: , Rfl:   aspirin EC 81 MG EC tablet, Take 1 tablet by mouth daily, Disp: 30 tablet, Rfl: 3  Potassium (POTASSIMIN PO), Take by mouth daily, Disp: , Rfl:   therapeutic multivitamin-minerals (THERAGRAN-M) tablet, Take 1 tablet by mouth daily. , Disp: , Rfl:   Ascorbic Acid (VITAMIN C) 500 MG tablet, Take 500 mg by mouth daily. , Disp: , Rfl:   Boswellia-Glucosamine-Vit D (GLUCOSAMINE COMPLEX PO), Take  by mouth., Disp: , Rfl:     No current facility-administered medications on file prior to visit.       No Known Allergies      Lab Results       Component                Value               Date                       NA                       139                 12/22/2020                 K                        4.0                 12/22/2020                 CL                       103                 12/22/2020                 CO2                      25                  12/22/2020                 BUN                      14                  12/22/2020                 CREATININE               0.95                12/22/2020                 GLUCOSE                  100 (H)             12/22/2020                 CALCIUM                  9.5                 12/22/2020 PROT                     7.1                 07/25/2015                 LABALBU                  4.3                 07/25/2015                 BILITOT                  0.22 (L)            07/25/2015                 ALKPHOS                  58                  07/25/2015                 AST                      10                  07/25/2015                 ALT                      13                  07/25/2015                 LABGLOM                  >60                 12/22/2020                 GFRAA                    >60                 12/22/2020              No results found for: LABA1C  No results found for: EAG    No results found for: CHOL  No results found for: TRIG  No results found for: HDL  No results found for: LDLCHOLESTEROL, LDLCALC  No results found for: LABVLDL, VLDL  No results found for: CHOLHDLRATIO                        Review of Systems   Constitutional: Negative. HENT:  Negative for congestion, ear pain, postnasal drip, sneezing and sore throat. Eyes:  Negative for visual disturbance. Respiratory: Negative. Cardiovascular:  Negative for chest pain, palpitations and leg swelling. Gastrointestinal:  Negative for abdominal pain, blood in stool, constipation, diarrhea and nausea. Genitourinary:  Negative for difficulty urinating, dysuria, frequency and urgency. Musculoskeletal:  Negative for arthralgias, joint swelling, myalgias, neck pain and neck stiffness. Skin: Negative. Neurological:  Negative for syncope. Psychiatric/Behavioral: Negative. Objective   Physical Exam  Vitals and nursing note reviewed. Constitutional:       Appearance: He is well-developed. He is obese. HENT:      Head: Atraumatic. Eyes:      Conjunctiva/sclera: Conjunctivae normal.   Cardiovascular:      Rate and Rhythm: Normal rate and regular rhythm. Heart sounds: Normal heart sounds.    Pulmonary:      Effort: Pulmonary effort is normal.      Breath sounds: Normal breath sounds. Abdominal:      Palpations: Abdomen is soft. Tenderness: There is no abdominal tenderness. Musculoskeletal:      Cervical back: Normal range of motion and neck supple. Lymphadenopathy:      Cervical: No cervical adenopathy. Skin:     Findings: No rash. Neurological:      Mental Status: He is alert. Psychiatric:         Behavior: Behavior normal.         Thought Content: Thought content normal.                An electronic signature was used to authenticate this note.     --Jamari Sandoval MD

## 2023-03-09 ENCOUNTER — OFFICE VISIT (OUTPATIENT)
Dept: FAMILY MEDICINE CLINIC | Age: 68
End: 2023-03-09

## 2023-03-09 VITALS
HEART RATE: 72 BPM | DIASTOLIC BLOOD PRESSURE: 89 MMHG | WEIGHT: 255 LBS | SYSTOLIC BLOOD PRESSURE: 139 MMHG | HEIGHT: 73 IN | BODY MASS INDEX: 33.8 KG/M2

## 2023-03-09 DIAGNOSIS — Z00.00 MEDICARE ANNUAL WELLNESS VISIT, SUBSEQUENT: Primary | ICD-10-CM

## 2023-03-09 SDOH — ECONOMIC STABILITY: FOOD INSECURITY: WITHIN THE PAST 12 MONTHS, THE FOOD YOU BOUGHT JUST DIDN'T LAST AND YOU DIDN'T HAVE MONEY TO GET MORE.: NEVER TRUE

## 2023-03-09 SDOH — ECONOMIC STABILITY: INCOME INSECURITY: HOW HARD IS IT FOR YOU TO PAY FOR THE VERY BASICS LIKE FOOD, HOUSING, MEDICAL CARE, AND HEATING?: NOT VERY HARD

## 2023-03-09 SDOH — ECONOMIC STABILITY: FOOD INSECURITY: WITHIN THE PAST 12 MONTHS, YOU WORRIED THAT YOUR FOOD WOULD RUN OUT BEFORE YOU GOT MONEY TO BUY MORE.: NEVER TRUE

## 2023-03-09 SDOH — ECONOMIC STABILITY: HOUSING INSECURITY
IN THE LAST 12 MONTHS, WAS THERE A TIME WHEN YOU DID NOT HAVE A STEADY PLACE TO SLEEP OR SLEPT IN A SHELTER (INCLUDING NOW)?: NO

## 2023-03-09 ASSESSMENT — PATIENT HEALTH QUESTIONNAIRE - PHQ9
SUM OF ALL RESPONSES TO PHQ QUESTIONS 1-9: 0
SUM OF ALL RESPONSES TO PHQ9 QUESTIONS 1 & 2: 0
SUM OF ALL RESPONSES TO PHQ QUESTIONS 1-9: 0
SUM OF ALL RESPONSES TO PHQ QUESTIONS 1-9: 0
2. FEELING DOWN, DEPRESSED OR HOPELESS: 0
SUM OF ALL RESPONSES TO PHQ QUESTIONS 1-9: 0
1. LITTLE INTEREST OR PLEASURE IN DOING THINGS: 0

## 2023-03-09 ASSESSMENT — LIFESTYLE VARIABLES
HOW OFTEN DO YOU HAVE A DRINK CONTAINING ALCOHOL: NEVER
HOW MANY STANDARD DRINKS CONTAINING ALCOHOL DO YOU HAVE ON A TYPICAL DAY: PATIENT DOES NOT DRINK

## 2023-03-09 NOTE — PROGRESS NOTES
Medicare Annual Wellness Visit    Isauro Lutz is here for Medicare AWV    Assessment & Plan   Medicare annual wellness visit, subsequent     Plan:  1. Medicare annual wellness visit, subsequent  See concerns and discussion. Recommendations for Preventive Services Due: see orders and patient instructions/AVS.  Recommended screening schedule for the next 5-10 years is provided to the patient in written form: see Patient Instructions/AVS.     ACP reviewed and verified. Subjective       Patient's complete Health Risk Assessment and screening values have been reviewed and are found in Flowsheets. The following problems were reviewed today and where indicated follow up appointments were made and/or referrals ordered. Positive Risk Factor Screenings with Interventions:                 Weight and Activity:  Physical Activity: Inactive    Days of Exercise per Week: 0 days    Minutes of Exercise per Session: 0 min     On average, how many days per week do you engage in moderate to strenuous exercise (like a brisk walk)?: 0 days  Have you lost any weight without trying in the past 3 months?: No  Body mass index: (!) 33.64      Inactivity Interventions:  Discussed walking 20 minutes daily. Obesity Interventions:  Weight has been stable. No concerned at this time. Vision Screen:  Do you have difficulty driving, watching TV, or doing any of your daily activities because of your eyesight?: No  Have you had an eye exam within the past year?: (!) No  No results found. Interventions:    Discussed having a yearly eye exam.                        Objective   Vitals:    03/09/23 0941   BP: 139/89   Pulse: 72   Weight: 255 lb (115.7 kg)   Height: 6' 1\" (1.854 m)      Body mass index is 33.64 kg/m². No Known Allergies  Prior to Visit Medications    Medication Sig Taking?  Authorizing Provider   sertraline (ZOLOFT) 25 MG tablet TAKE 1 TABLET BY MOUTH DAILY Yes Sherren Shadow Back, MD   Omega-3 Fatty Acids (FISH OIL PO) Take by mouth Yes Historical Provider, MD   VITAMIN E PO Take by mouth Yes Historical Provider, MD   ZINC PO Take by mouth Yes Historical Provider, MD   Cyanocobalamin (B-12 PO) Take by mouth Yes Historical Provider, MD   VITAMIN D PO Take by mouth Yes Historical Provider, MD   aspirin EC 81 MG EC tablet Take 1 tablet by mouth daily Yes Hernan Rose MD   Potassium (POTASSIMIN PO) Take by mouth daily Yes Historical Provider, MD   therapeutic multivitamin-minerals (THERAGRAN-M) tablet Take 1 tablet by mouth daily. Yes Historical Provider, MD   Ascorbic Acid (VITAMIN C) 500 MG tablet Take 500 mg by mouth daily. Yes Historical Provider, MD   Boswellia-Glucosamine-Vit D (GLUCOSAMINE COMPLEX PO) Take  by mouth.  Yes Historical Provider, MD [place] (Including outside providers/suppliers regularly involved in providing care):   Patient Care Team:  Alex Bejarano MD as PCP - General (Internal Medicine)  Alex Bejarano MD as PCP - Empaneled Provider     Reviewed and updated this visit:  Tobacco  Allergies  Meds  Med Hx  Surg Hx  Soc Hx  Fam Hx             Alex Bejarano MD

## 2023-03-09 NOTE — PATIENT INSTRUCTIONS
Survey: You may be receiving a survey from [employer] regarding your visit today. You may get this in the mail, through your MyChart or in your email. Please complete the survey to enable us to provide the highest quality of care to you and your family. Please also, mention our names. If you cannot score us as very good (5 Stars) on any question, please feel free to call the office to discuss how we could have made your experience exceptional.      Thank You! MD Ami Jeff, YINKA Gallardo, Manuel Montana, MARTAN ABELARDO Hernandez About Being Active as an Older Adult  Why is being active important as you get older? Being active is one of the best things you can do for your health. And it's never too late to start. Being active--or getting active, if you aren't already--has definite benefits. It can:  Give you more energy,  Keep your mind sharp. Improve balance to reduce your risk of falls. Help you manage chronic illness with fewer medicines. No matter how old you are, how fit you are, or what health problems you have, there is a form of activity that will work for you. And the more physical activity you can do, the better your overall health will be. What kinds of activity can help you stay healthy? Being more active will make your daily activities easier. Physical activity includes planned exercise and things you do in daily life. There are four types of activity:  Aerobic. Doing aerobic activity makes your heart and lungs strong. Includes walking, dancing, and gardening. Aim for at least 2½ hours spread throughout the week. It improves your energy and can help you sleep better. Muscle-strengthening. This type of activity can help maintain muscle and strengthen bones. Includes climbing stairs, using resistance bands, and lifting or carrying heavy loads. Aim for at least twice a week.   It can help protect the knees and other joints. Stretching. Stretching gives you better range of motion in joints and muscles. Includes upper arm stretches, calf stretches, and gentle yoga. Aim for at least twice a week, preferably after your muscles are warmed up from other activities. It can help you function better in daily life. Balancing. This helps you stay coordinated and have good posture. Includes heel-to-toe walking, julius chi, and certain types of yoga. Aim for at least 3 days a week. It can reduce your risk of falling. Even if you have a hard time meeting the recommendations, it's better to be more active than less active. All activity done in each category counts toward your weekly total. You'd be surprised how daily things like carrying groceries, keeping up with grandchildren, and taking the stairs can add up. What keeps you from being active? If you've had a hard time being more active, you're not alone. Maybe you remember being able to do more. Or maybe you've never thought of yourself as being active. It's frustrating when you can't do the things you want. Being more active can help. What's holding you back? Getting started. Have a goal, but break it into easy tasks. Small steps build into big accomplishments. Staying motivated. If you feel like skipping your activity, remember your goal. Maybe you want to move better and stay independent. Every activity gets you one step closer. Not feeling your best.  Start with 5 minutes of an activity you enjoy. Prove to yourself you can do it. As you get comfortable, increase your time. You may not be where you want to be. But you're in the process of getting there. Everyone starts somewhere. How can you find safe ways to stay active? Talk with your doctor about any physical challenges you're facing. Make a plan with your doctor if you have a health problem or aren't sure how to get started with activity.   If you're already active, ask your doctor if there is anything you should change to stay safe as your body and health change. If you tend to feel dizzy after you take medicine, avoid activity at that time. Try being active before you take your medicine. This will reduce your risk of falls. If you plan to be active at home, make sure to clear your space before you get started. Remove things like TV cords, coffee tables, and throw rugs. It's safest to have plenty of space to move freely. The key to getting more active is to take it slow and steady. Try to improve only a little bit at a time. Pick just one area to improve on at first. And if an activity hurts, stop and talk to your doctor. Where can you learn more? Go to http://www.greco.com/ and enter P600 to learn more about \"Learning About Being Active as an Older Adult. \"  Current as of: October 10, 2022               Content Version: 13.5  © 2006-2022 [employer]. Care instructions adapted under license by [facility] ([facility]). If you have questions about a medical condition or this instruction, always ask your healthcare professional. [place] [address] any warranty or liability for your use of this information. Learning About Vision Tests  What are vision tests? The four most common vision tests are visual acuity tests, refraction, visual field tests, and color vision tests. Visual acuity (sharpness) tests  These tests are used: To see if you need glasses or contact lenses. To monitor an eye problem. To check an eye injury. Visual acuity tests are done as part of routine exams. You may also have this test when you get your 's license or apply for some types of jobs. Visual field tests  These tests are used: To check for vision loss in any area of your range of vision. To screen for certain eye diseases. To look for nerve damage after a stroke, head injury, or other problem that could reduce blood flow to the brain.   Refraction and color tests  A refraction test is done to find the right prescription for glasses and contact lenses. A color vision test is done to check for color blindness. Color vision is often tested as part of a routine exam. You may also have this test when you apply for a job where recognizing different colors is important, such as , electronics, or the [employer]. How are vision tests done? Visual acuity test   You cover one eye at a time. You read aloud from a wall chart across the room. You read aloud from a small card that you hold in your hand. Refraction   You look into a special device. The device puts lenses of different strengths in front of each eye to see how strong your glasses or contact lenses need to be. Visual field tests   Your doctor may have you look through special machines. Or your doctor may simply have you stare straight ahead while they move a finger into and out of your field of vision. Color vision test   You look at pieces of printed test patterns in various colors. You say what number or symbol you see. Your doctor may have you trace the number or symbol using a pointer. How do these tests feel? There is very little chance of having a problem from this test. If dilating drops are used for a vision test, they may make the eyes sting and cause a medicine taste in the mouth. Follow-up care is a key part of your treatment and safety. Be sure to make and go to all appointments, and call your doctor if you are having problems. It's also a good idea to know your test results and keep a list of the medicines you take. Where can you learn more? Go to http://www.greco.com/ and enter G551 to learn more about \"Learning About Vision Tests. \"  Current as of: October 12, 2022               Content Version: 13.5  © 2542-0910 Healthwise[employer]. Care instructions adapted under license by [facility] ([facility]).  If you have questions about a medical condition or this instruction, always ask your healthcare professional. [address] any warranty or liability for your use of this information. Advance Directives: Care Instructions  Overview  An advance directive is a legal way to state your wishes at the end of your life. It tells your family and your doctor what to do if you can't say what you want. There are two main types of advance directives. You can change them any time your wishes change. Living will. This form tells your family and your doctor your wishes about life support and other treatment. The form is also called a declaration. Medical power of . This form lets you name a person to make treatment decisions for you when you can't speak for yourself. This person is called a health care agent (health care proxy, health care surrogate). The form is also called a durable power of  for health care. If you do not have an advance directive, decisions about your medical care may be made by a family member, or by a doctor or a  who doesn't know you. It may help to think of an advance directive as a gift to the people who care for you. If you have one, they won't have to make tough decisions by themselves. For more information, including forms for your state, see the [address] website (www.caringinfo.org/planning/advance-directives/). Follow-up care is a key part of your treatment and safety. Be sure to make and go to all appointments, and call your doctor if you are having problems. It's also a good idea to know your test results and keep a list of the medicines you take. What should you include in an advance directive? Many states have a unique advance directive form. (It may ask you to address specific issues.) Or you might use a universal form that's approved by many states. If your form doesn't tell you what to address, it may be hard to know what to include in your advance directive. Use the questions below to help you get started.   Who do you want to make decisions about your medical care if you are not able to? What life-support measures do you want if you have a serious illness that gets worse over time or can't be cured? What are you most afraid of that might happen? (Maybe you're afraid of having pain, losing your independence, or being kept alive by machines.)  Where would you prefer to die? (Your home? A hospital? A nursing home?)  Do you want to donate your organs when you die? Do you want certain Christianity practices performed before you die? When should you call for help? Be sure to contact your doctor if you have any questions. Where can you learn more? Go to http://www.greco.com/ and enter R264 to learn more about \"Advance Directives: Care Instructions. \"  Current as of: June 16, 2022               Content Version: 13.5  © 1934-5770 [employer]. Care instructions adapted under license by [facility] ([facility]). If you have questions about a medical condition or this instruction, always ask your healthcare professional. [address] any warranty or liability for your use of this information. Starting a Weight Loss Plan: Care Instructions  Overview     If you're thinking about losing weight, it can be hard to know where to start. Your doctor can help you set up a weight loss plan that best meets your needs. You may want to take a class on nutrition or exercise, or you could join a weight loss support group. If you have questions about how to make changes to your eating or exercise habits, ask your doctor about seeing a registered dietitian or an exercise specialist.  It can be a big challenge to lose weight. But you don't have to make huge changes at once. Make small changes, and stick with them. When those changes become habit, add a few more changes. If you don't think you're ready to make changes right now, try to pick a date in the future.  Make an appointment to see your doctor to discuss whether the time is right for you to start a plan. Follow-up care is a key part of your treatment and safety. Be sure to make and go to all appointments, and call your doctor if you are having problems. It's also a good idea to know your test results and keep a list of the medicines you take. How can you care for yourself at home? Set realistic goals. Many people expect to lose much more weight than is likely. A weight loss of 5% to 10% of your body weight may be enough to improve your health. Get family and friends involved to provide support. Talk to them about why you are trying to lose weight, and ask them to help. They can help by participating in exercise and having meals with you, even if they may be eating something different. Find what works best for you. If you do not have time or do not like to cook, a program that offers meal replacement bars or shakes may be better for you. Or if you like to prepare meals, finding a plan that includes daily menus and recipes may be best.  Ask your doctor about other health professionals who can help you achieve your weight loss goals. A dietitian can help you make healthy changes in your diet. An exercise specialist or  can help you develop a safe and effective exercise program.  A counselor or psychiatrist can help you cope with issues such as depression, anxiety, or family problems that can make it hard to focus on weight loss. Consider joining a support group for people who are trying to lose weight. Your doctor can suggest groups in your area. Where can you learn more? Go to http://www.woods.com/ and enter U357 to learn more about \"Starting a Weight Loss Plan: Care Instructions. \"  Current as of: August 25, 2022               Content Version: 13.5  © 5040-7887 Healthwise, Incorporated. Care instructions adapted under license by [facility] ([facility]).  If you have questions about a medical condition or this instruction, always ask your healthcare professional. [place] [address] any warranty or liability for your use of this information. A Healthy Heart: Care Instructions  Your Care Instructions     Coronary artery disease, also called heart disease, occurs when a substance called plaque builds up in the vessels that supply oxygen-rich blood to your heart muscle. This can narrow the blood vessels and reduce blood flow. A heart attack happens when blood flow is completely blocked. A high-fat diet, smoking, and other factors increase the risk of heart disease. Your doctor has found that you have a chance of having heart disease. You can do lots of things to keep your heart healthy. It may not be easy, but you can change your diet, exercise more, and quit smoking. These steps really work to lower your chance of heart disease. Follow-up care is a key part of your treatment and safety. Be sure to make and go to all appointments, and call your doctor if you are having problems. It's also a good idea to know your test results and keep a list of the medicines you take. How can you care for yourself at home? Diet    Use less salt when you cook and eat. This helps lower your blood pressure. Taste food before salting. Add only a little salt when you think you need it. With time, your taste buds will adjust to less salt.     Eat fewer snack items, fast foods, canned soups, and other high-salt, high-fat, processed foods.     Read food labels and try to avoid saturated and trans fats. They increase your risk of heart disease by raising cholesterol levels.     Limit the amount of solid fat-butter, margarine, and shortening-you eat. Use olive, peanut, or canola oil when you cook. Bake, broil, and steam foods instead of frying them.     Eat a variety of fruit and vegetables every day. Dark green, deep orange, red, or yellow fruits and vegetables are especially good for you.  Examples include spinach, carrots, peaches, and berries.     Foods high in fiber can reduce your cholesterol and provide important vitamins and minerals. High-fiber foods include whole-grain cereals and breads, oatmeal, beans, brown rice, citrus fruits, and apples.     Eat lean proteins. Heart-healthy proteins include seafood, lean meats and poultry, eggs, beans, peas, nuts, seeds, and soy products.     Limit drinks and foods with added sugar. These include candy, desserts, and soda pop. Lifestyle changes    If your doctor recommends it, get more exercise. Walking is a good choice. Bit by bit, increase the amount you walk every day. Try for at least 30 minutes on most days of the week. You also may want to swim, bike, or do other activities.     Do not smoke. If you need help quitting, talk to your doctor about stop-smoking programs and medicines. These can increase your chances of quitting for good. Quitting smoking may be the most important step you can take to protect your heart. It is never too late to quit.     Limit alcohol to 2 drinks a day for men and 1 drink a day for women. Too much alcohol can cause health problems.     Manage other health problems such as diabetes, high blood pressure, and high cholesterol. If you think you may have a problem with alcohol or drug use, talk to your doctor. Medicines    Take your medicines exactly as prescribed. Call your doctor if you think you are having a problem with your medicine.     If your doctor recommends aspirin, take the amount directed each day. Make sure you take aspirin and not another kind of pain reliever, such as acetaminophen (Tylenol). When should you call for help? Call 911 if you have symptoms of a heart attack.  These may include:    Chest pain or pressure, or a strange feeling in the chest.     Sweating.     Shortness of breath.     Pain, pressure, or a strange feeling in the back, neck, jaw, or upper belly or in one or both shoulders or arms.     Lightheadedness or sudden weakness.     A fast or irregular heartbeat. After you call 911, the  may tell you to chew 1 adult-strength or 2 to 4 low-dose aspirin. Wait for an ambulance. Do not try to drive yourself. Watch closely for changes in your health, and be sure to contact your doctor if you have any problems. Where can you learn more? Go to http://www.greco.com/ and enter F075 to learn more about \"A Healthy Heart: Care Instructions. \"  Current as of: September 7, 2022               Content Version: 13.5  © 2650-0804 Healthwise, [employer]. Care instructions adapted under license by [facility] ([facility]). If you have questions about a medical condition or this instruction, always ask your healthcare professional. [address] any warranty or liability for your use of this information. Personalized Preventive Plan for Yodit Tuttle - 3/9/2023  Medicare offers a range of preventive health benefits. Some of the tests and screenings are paid in full while other may be subject to a deductible, co-insurance, and/or copay. Some of these benefits include a comprehensive review of your medical history including lifestyle, illnesses that may run in your family, and various assessments and screenings as appropriate. After reviewing your medical record and screening and assessments performed today your provider may have ordered immunizations, labs, imaging, and/or referrals for you. A list of these orders (if applicable) as well as your Preventive Care list are included within your After Visit Summary for your review. Other Preventive Recommendations:    A preventive eye exam performed by an eye specialist is recommended every 1-2 years to screen for glaucoma; cataracts, macular degeneration, and other eye disorders. A preventive dental visit is recommended every 6 months. Try to get at least 150 minutes of exercise per week or 10,000 steps per day on a pedometer .   Order or download the FREE \"Exercise & Physical Activity: Your Everyday Guide\" from [employer]. Call 4-809.335.5968 or search [employer] online. You need 9842-0184 mg of calcium and 3955-8680 IU of vitamin D per day. It is possible to meet your calcium requirement with diet alone, but a vitamin D supplement is usually necessary to meet this goal.  When exposed to the sun, use a sunscreen that protects against both UVA and UVB radiation with an SPF of 30 or greater. Reapply every 2 to 3 hours or after sweating, drying off with a towel, or swimming. Always wear a seat belt when traveling in a car. Always wear a helmet when riding a bicycle or motorcycle.

## 2023-05-25 DIAGNOSIS — F41.1 GENERALIZED ANXIETY DISORDER: ICD-10-CM

## 2023-05-25 DIAGNOSIS — F51.02 ADJUSTMENT INSOMNIA: ICD-10-CM

## 2023-05-25 RX ORDER — SERTRALINE HYDROCHLORIDE 25 MG/1
25 TABLET, FILM COATED ORAL DAILY
Qty: 90 TABLET | Refills: 1 | Status: SHIPPED | OUTPATIENT
Start: 2023-05-25

## 2023-06-08 ENCOUNTER — OFFICE VISIT (OUTPATIENT)
Dept: FAMILY MEDICINE CLINIC | Age: 68
End: 2023-06-08
Payer: MEDICARE

## 2023-06-08 VITALS
DIASTOLIC BLOOD PRESSURE: 66 MMHG | BODY MASS INDEX: 32.87 KG/M2 | HEART RATE: 80 BPM | WEIGHT: 248 LBS | SYSTOLIC BLOOD PRESSURE: 126 MMHG | HEIGHT: 73 IN

## 2023-06-08 DIAGNOSIS — F41.1 GENERALIZED ANXIETY DISORDER: Primary | ICD-10-CM

## 2023-06-08 DIAGNOSIS — H61.21 IMPACTED CERUMEN OF RIGHT EAR: ICD-10-CM

## 2023-06-08 PROCEDURE — 1036F TOBACCO NON-USER: CPT | Performed by: INTERNAL MEDICINE

## 2023-06-08 PROCEDURE — 99213 OFFICE O/P EST LOW 20 MIN: CPT | Performed by: INTERNAL MEDICINE

## 2023-06-08 PROCEDURE — G8427 DOCREV CUR MEDS BY ELIG CLIN: HCPCS | Performed by: INTERNAL MEDICINE

## 2023-06-08 PROCEDURE — 69210 REMOVE IMPACTED EAR WAX UNI: CPT | Performed by: INTERNAL MEDICINE

## 2023-06-08 PROCEDURE — 1123F ACP DISCUSS/DSCN MKR DOCD: CPT | Performed by: INTERNAL MEDICINE

## 2023-06-08 PROCEDURE — G8417 CALC BMI ABV UP PARAM F/U: HCPCS | Performed by: INTERNAL MEDICINE

## 2023-06-08 PROCEDURE — 3017F COLORECTAL CA SCREEN DOC REV: CPT | Performed by: INTERNAL MEDICINE

## 2023-06-08 ASSESSMENT — ENCOUNTER SYMPTOMS
RESPIRATORY NEGATIVE: 1
DIARRHEA: 0
ABDOMINAL PAIN: 0
SORE THROAT: 0
BLOOD IN STOOL: 0
NAUSEA: 0
CONSTIPATION: 0

## 2023-06-08 NOTE — PATIENT INSTRUCTIONS
Survey: You may be receiving a survey from [employer] regarding your visit today. You may get this in the mail, through your MyChart or in your email. Please complete the survey to enable us to provide the highest quality of care to you and your family. Please also, mention our names. If you cannot score us as very good (5 Stars) on any question, please feel free to call the office to discuss how we could have made your experience exceptional.      Thank You! MD [facility], [address] [place] [address], VALDEMAR Claudio RN       Plan:  1. Generalized anxiety disorder  Controlled on Zoloft. No change in medication. 2. Impacted cerumen of right ear  After direct visualization with the otoscope  cerumen impaction was identified in the Right ear/s. The procedure for cerumen removal was discussed with Robb Jeffrey. The risks were discussed including pain, trauma to the external auditory canal, bleeding, and possible tympanic membrane perforation with loss of hearing. After verbal consent was obtained, Robb Murphy was draped to prevent soiling of his clothing. With the use of a 50 cc syringe fitted with a flexible angiocath,  Right ear/s was irrigated 2 times to displace the cerumen away from the TM. With the use of an otoscope to directly visualize the cerumen a cerumen stylet was used with complete removal of the cerumen. The tympanic membranes appeared normal post procedure. The external auditory canal appeared normal.  Malvin tolerated the procedure well.  he was instructed to use 2 drops of baby oil in each ear two times a week to prevent ear wax accumulation.    - [place] REMOVAL IMPACTED CERUMEN INSTRUMENTATION Ayesha Score was instructed to follow up in the clinic in 6 months for check up or as needed with any medical issues.

## 2023-06-08 NOTE — PROGRESS NOTES
Jamila Dow (:  1955) is a 75 y.o. male,Established patient, here for evaluation of the following chief complaint(s):  Mental Health Problem (6 month check up. ) and Ear Fullness         ASSESSMENT/PLAN:  1. Generalized anxiety disorder  2. Impacted cerumen of right ear  -     NE REMOVAL IMPACTED CERUMEN INSTRUMENTATION UNILAT    Plan:  1. Generalized anxiety disorder  Controlled on Zoloft. No change in medication. 2. Impacted cerumen of right ear  After direct visualization with the otoscope  cerumen impaction was identified in the Right ear/s. The procedure for cerumen removal was discussed with Josh Gray. The risks were discussed including pain, trauma to the external auditory canal, bleeding, and possible tympanic membrane perforation with loss of hearing. After verbal consent was obtained, Josh Gray was draped to prevent soiling of his clothing. With the use of a 50 cc syringe fitted with a flexible angiocath,  Right ear/s was irrigated 2 times to displace the cerumen away from the TM. With the use of an otoscope to directly visualize the cerumen a cerumen stylet was used with complete removal of the cerumen. The tympanic membranes appeared normal post procedure. The external auditory canal appeared normal.  Malvin tolerated the procedure well.  he was instructed to use 2 drops of baby oil in each ear two times a week to prevent ear wax accumulation.    - [place] REMOVAL IMPACTED CERUMEN INSTRUMENTATION Jose Persaud was instructed to follow up in the clinic in 6 months for check up or as needed with any medical issues. Subjective   SUBJECTIVE/OBJECTIVE:  Josh Gray presents for a check up on his medical conditions anxiety. Josh Gray admits to new problems. Ear fullness. Medications were reviewed with Josh Gray, he is  tolerating the medication. Bowels are regular. There has not been rectal bleeding. Josh Gray denies urinary complications, the urine stream is good.

## 2023-11-20 DIAGNOSIS — F41.1 GENERALIZED ANXIETY DISORDER: ICD-10-CM

## 2023-11-20 DIAGNOSIS — F51.02 ADJUSTMENT INSOMNIA: ICD-10-CM

## 2023-11-21 RX ORDER — SERTRALINE HYDROCHLORIDE 25 MG/1
25 TABLET, FILM COATED ORAL DAILY
Qty: 90 TABLET | Refills: 1 | Status: SHIPPED | OUTPATIENT
Start: 2023-11-21

## 2023-12-07 ENCOUNTER — OFFICE VISIT (OUTPATIENT)
Dept: FAMILY MEDICINE CLINIC | Age: 68
End: 2023-12-07
Payer: MEDICARE

## 2023-12-07 VITALS
BODY MASS INDEX: 34.67 KG/M2 | SYSTOLIC BLOOD PRESSURE: 128 MMHG | WEIGHT: 256 LBS | HEART RATE: 76 BPM | DIASTOLIC BLOOD PRESSURE: 82 MMHG | HEIGHT: 72 IN

## 2023-12-07 DIAGNOSIS — F41.1 GENERALIZED ANXIETY DISORDER: Primary | ICD-10-CM

## 2023-12-07 DIAGNOSIS — Z23 NEED FOR INFLUENZA VACCINATION: ICD-10-CM

## 2023-12-07 PROCEDURE — G8427 DOCREV CUR MEDS BY ELIG CLIN: HCPCS | Performed by: INTERNAL MEDICINE

## 2023-12-07 PROCEDURE — 1036F TOBACCO NON-USER: CPT | Performed by: INTERNAL MEDICINE

## 2023-12-07 PROCEDURE — 1123F ACP DISCUSS/DSCN MKR DOCD: CPT | Performed by: INTERNAL MEDICINE

## 2023-12-07 PROCEDURE — G8482 FLU IMMUNIZE ORDER/ADMIN: HCPCS | Performed by: INTERNAL MEDICINE

## 2023-12-07 PROCEDURE — G0008 ADMIN INFLUENZA VIRUS VAC: HCPCS | Performed by: INTERNAL MEDICINE

## 2023-12-07 PROCEDURE — 99213 OFFICE O/P EST LOW 20 MIN: CPT | Performed by: INTERNAL MEDICINE

## 2023-12-07 PROCEDURE — 90674 CCIIV4 VAC NO PRSV 0.5 ML IM: CPT | Performed by: INTERNAL MEDICINE

## 2023-12-07 PROCEDURE — 3017F COLORECTAL CA SCREEN DOC REV: CPT | Performed by: INTERNAL MEDICINE

## 2023-12-07 PROCEDURE — G8417 CALC BMI ABV UP PARAM F/U: HCPCS | Performed by: INTERNAL MEDICINE

## 2023-12-07 ASSESSMENT — ENCOUNTER SYMPTOMS
ABDOMINAL PAIN: 0
DIARRHEA: 0
NAUSEA: 0
CONSTIPATION: 0
SORE THROAT: 0
BLOOD IN STOOL: 0
RESPIRATORY NEGATIVE: 1

## 2023-12-07 NOTE — PATIENT INSTRUCTIONS
Survey: You may be receiving a survey from [employer] regarding your visit today. You may get this in the mail, through your MyChart or in your email. Please complete the survey to enable us to provide the highest quality of care to you and your family. Please also, mention our names. If you cannot score us as very good (5 Stars) on any question, please feel free to call the office to discuss how we could have made your experience exceptional.      Thank You! MD Madeleine Tucker [facility], [facility], [place] [facility]    [place]aya [place], [place]     Plan:  1. Need for influenza vaccination  Deysi Wright was given an influenza vaccine today. - Influenza, FLUCELVAX, (age 10 mo+), IM, Preservative Free, 0.5 mL    2. Generalized anxiety disorder  Has been stable on Zoloft. No change in medication. Deysi Wright was instructed to follow up in the clinic in 6 months for check up or as needed with any medical issues.

## 2023-12-07 NOTE — PROGRESS NOTES
Vaccine Information Sheet, \"Influenza - Inactivated\"  given to [facility], or parent/legal guardian of  [facility] and verbalized understanding. Patient responses:    Have you ever had a reaction to a flu vaccine? No  Are you able to eat eggs without adverse effects? Yes  Do you have any current illness? No  Have you ever had Guillian Cardinal Syndrome? No    Flu vaccine given per order. Please see immunization tab.
4.0                 12/22/2020                 CL                       103                 12/22/2020                 CO2                      25                  12/22/2020                 BUN                      14                  12/22/2020                 CREATININE               0.95                12/22/2020                 GLUCOSE                  100 (H)             12/22/2020                 CALCIUM                  9.5                 12/22/2020                 PROT                     7.1                 07/25/2015                 LABALBU                  4.3                 07/25/2015                 BILITOT                  0.22 (L)            07/25/2015                 ALKPHOS                  58                  07/25/2015                 AST                      10                  07/25/2015                 ALT                      13                  07/25/2015                 LABGLOM                  >60                 12/22/2020                 GFRAA                    >60                 12/22/2020              No results found for: \"LABA1C\"  No results found for: \"EAG\"    No results found for: \"CHOL\"  No results found for: \"TRIG\"  No results found for: \"HDL\"  No results found for: \"LDLCHOLESTEROL\", \"LDLCALC\"  No results found for: \"LABVLDL\", \"VLDL\"  No results found for: \"CHOLHDLRATIO\"                          Review of Systems   Constitutional: Negative. HENT:  Negative for congestion, ear pain, postnasal drip, sneezing and sore throat. Eyes:  Negative for visual disturbance. Respiratory: Negative. Cardiovascular:  Negative for chest pain, palpitations and leg swelling. Gastrointestinal:  Negative for abdominal pain, blood in stool, constipation, diarrhea and nausea. Genitourinary:  Negative for difficulty urinating, dysuria, frequency and urgency. Musculoskeletal:  Negative for arthralgias, joint swelling, myalgias, neck pain and neck stiffness. Skin: Negative.

## 2024-03-14 ENCOUNTER — OFFICE VISIT (OUTPATIENT)
Dept: FAMILY MEDICINE CLINIC | Age: 69
End: 2024-03-14

## 2024-03-14 VITALS
HEART RATE: 82 BPM | SYSTOLIC BLOOD PRESSURE: 138 MMHG | WEIGHT: 256 LBS | DIASTOLIC BLOOD PRESSURE: 84 MMHG | HEIGHT: 73 IN | BODY MASS INDEX: 33.93 KG/M2

## 2024-03-14 DIAGNOSIS — Z00.00 MEDICARE ANNUAL WELLNESS VISIT, SUBSEQUENT: Primary | ICD-10-CM

## 2024-03-14 SDOH — ECONOMIC STABILITY: FOOD INSECURITY: WITHIN THE PAST 12 MONTHS, THE FOOD YOU BOUGHT JUST DIDN'T LAST AND YOU DIDN'T HAVE MONEY TO GET MORE.: NEVER TRUE

## 2024-03-14 SDOH — ECONOMIC STABILITY: FOOD INSECURITY: WITHIN THE PAST 12 MONTHS, YOU WORRIED THAT YOUR FOOD WOULD RUN OUT BEFORE YOU GOT MONEY TO BUY MORE.: NEVER TRUE

## 2024-03-14 SDOH — ECONOMIC STABILITY: INCOME INSECURITY: HOW HARD IS IT FOR YOU TO PAY FOR THE VERY BASICS LIKE FOOD, HOUSING, MEDICAL CARE, AND HEATING?: NOT VERY HARD

## 2024-03-14 ASSESSMENT — PATIENT HEALTH QUESTIONNAIRE - PHQ9
SUM OF ALL RESPONSES TO PHQ QUESTIONS 1-9: 0
1. LITTLE INTEREST OR PLEASURE IN DOING THINGS: 0
2. FEELING DOWN, DEPRESSED OR HOPELESS: 0
SUM OF ALL RESPONSES TO PHQ QUESTIONS 1-9: 0
SUM OF ALL RESPONSES TO PHQ QUESTIONS 1-9: 0
SUM OF ALL RESPONSES TO PHQ9 QUESTIONS 1 & 2: 0
SUM OF ALL RESPONSES TO PHQ QUESTIONS 1-9: 0

## 2024-03-14 NOTE — PROGRESS NOTES
Hernan Rose MD   Omega-3 Fatty Acids (FISH OIL PO) Take by mouth Yes Erika Jamil MD   VITAMIN E PO Take by mouth Yes Erika Jamil MD   ZINC PO Take by mouth Yes Erika Jamil MD   Cyanocobalamin (B-12 PO) Take by mouth Yes Erika Jamil MD   VITAMIN D PO Take by mouth Yes Erika Jamil MD   aspirin EC 81 MG EC tablet Take 1 tablet by mouth daily Yes Hernan Rose MD   Potassium (POTASSIMIN PO) Take by mouth daily Yes Erika Jamil MD   therapeutic multivitamin-minerals (THERAGRAN-M) tablet Take 1 tablet by mouth daily Yes Erika Jamil MD   Ascorbic Acid (VITAMIN C) 500 MG tablet Take 1 tablet by mouth daily Yes Erika Jamil MD   Boswellia-Glucosamine-Vit D (GLUCOSAMINE COMPLEX PO) Take  by mouth. Yes Provider, Historical, MD       CareTeam (Including outside providers/suppliers regularly involved in providing care):   Patient Care Team:  Hernan Rose MD as PCP - General (Internal Medicine)  Hernan Rose MD as PCP - Empaneled Provider     Reviewed and updated this visit:  Tobacco  Allergies  Meds  Med Hx  Surg Hx  Soc Hx  Fam Hx

## 2024-05-21 DIAGNOSIS — F51.02 ADJUSTMENT INSOMNIA: ICD-10-CM

## 2024-05-21 DIAGNOSIS — F41.1 GENERALIZED ANXIETY DISORDER: ICD-10-CM

## 2024-05-21 RX ORDER — SERTRALINE HYDROCHLORIDE 25 MG/1
25 TABLET, FILM COATED ORAL DAILY
Qty: 90 TABLET | Refills: 1 | Status: SHIPPED | OUTPATIENT
Start: 2024-05-21

## 2024-05-21 NOTE — TELEPHONE ENCOUNTER
Last OV 3/14/24 AWV     Next OV 6/20/24    Requesting refill on sertraline thru [facility].  Rx pending

## 2024-06-20 ENCOUNTER — OFFICE VISIT (OUTPATIENT)
Dept: FAMILY MEDICINE CLINIC | Age: 69
End: 2024-06-20

## 2024-06-20 VITALS
DIASTOLIC BLOOD PRESSURE: 76 MMHG | HEART RATE: 76 BPM | BODY MASS INDEX: 35.28 KG/M2 | WEIGHT: 252 LBS | HEIGHT: 71 IN | SYSTOLIC BLOOD PRESSURE: 128 MMHG

## 2024-06-20 DIAGNOSIS — F41.1 GENERALIZED ANXIETY DISORDER: Primary | ICD-10-CM

## 2024-06-20 ASSESSMENT — ENCOUNTER SYMPTOMS
DIARRHEA: 0
RESPIRATORY NEGATIVE: 1
BLOOD IN STOOL: 0
NAUSEA: 0
SORE THROAT: 0
ABDOMINAL PAIN: 0
CONSTIPATION: 0

## 2024-06-20 ASSESSMENT — PATIENT HEALTH QUESTIONNAIRE - PHQ9
SUM OF ALL RESPONSES TO PHQ QUESTIONS 1-9: 0
SUM OF ALL RESPONSES TO PHQ9 QUESTIONS 1 & 2: 0
SUM OF ALL RESPONSES TO PHQ QUESTIONS 1-9: 0
SUM OF ALL RESPONSES TO PHQ QUESTIONS 1-9: 0
2. FEELING DOWN, DEPRESSED OR HOPELESS: NOT AT ALL
SUM OF ALL RESPONSES TO PHQ QUESTIONS 1-9: 0
1. LITTLE INTEREST OR PLEASURE IN DOING THINGS: NOT AT ALL

## 2024-06-20 NOTE — PROGRESS NOTES
hard  Food Insecurity: No Food Insecurity (3/14/2024)      Hunger Vital Sign          Worried About Running Out of Food in the Last Year: Never true          Ran Out of Food in the Last Year: Never true  Transportation Needs: Unknown (3/14/2024)      PRAPARE - Transportation          Lack of Transportation (Medical): Not on file          Lack of Transportation (Non-Medical): No  Physical Activity: Inactive (3/14/2024)      Exercise Vital Sign          Days of Exercise per Week: 0 days          Minutes of Exercise per Session: 0 min  Stress: Not on file  Social Connections: Not on file  Intimate Partner Violence: Not on file  Housing Stability: Unknown (3/14/2024)      Housing Stability Vital Sign          Unable to Pay for Housing in the Last Year: Not on file          Number of Places Lived in the Last Year: Not on file          Unstable Housing in the Last Year: No    No family history on file.      Current Outpatient Medications on File Prior to Visit:  sertraline (ZOLOFT) 25 MG tablet, TAKE 1 TABLET BY MOUTH DAILY, Disp: 90 tablet, Rfl: 1  Omega-3 Fatty Acids (FISH OIL PO), Take by mouth, Disp: , Rfl:   VITAMIN E PO, Take by mouth, Disp: , Rfl:   ZINC PO, Take by mouth, Disp: , Rfl:   Cyanocobalamin (B-12 PO), Take by mouth, Disp: , Rfl:   VITAMIN D PO, Take by mouth, Disp: , Rfl:   aspirin EC 81 MG EC tablet, Take 1 tablet by mouth daily, Disp: 30 tablet, Rfl: 3  Potassium (POTASSIMIN PO), Take by mouth daily, Disp: , Rfl:   therapeutic multivitamin-minerals (THERAGRAN-M) tablet, Take 1 tablet by mouth daily, Disp: , Rfl:   Ascorbic Acid (VITAMIN C) 500 MG tablet, Take 1 tablet by mouth daily, Disp: , Rfl:   Boswellia-Glucosamine-Vit D (GLUCOSAMINE COMPLEX PO), Take  by mouth., Disp: , Rfl:     No current facility-administered medications on file prior to visit.      No Known Allergies      Lab Results       Component                Value               Date                       NA                       139

## 2024-06-20 NOTE — PATIENT INSTRUCTIONS
Survey:     You may be receiving a survey from [email or web address] your visit today.     You may get this in the mail, through your MyChart or in your email.      Please complete the survey to enable us to provide the highest quality of care to you and your family. Please also, mention our names.     If you cannot score us as very good (5 Stars) on any question, please feel free to call the office to discuss how we could have made your experience exceptional.      Thank You!        Dr. Rose, MD Clark, YINKA Gallardo, GALLITO Tello, FORT[place] Mills, GALLITO Yepez, GALLITO       Plan:  1. Generalized anxiety disorder  Doing well taking Zoloft 25 mg every other day.  No change in medication at this time.    Malvin was instructed to follow up in the clinic in 1 year  for check up or as needed with any medical issues.

## 2025-04-04 ENCOUNTER — OFFICE VISIT (OUTPATIENT)
Dept: FAMILY MEDICINE CLINIC | Age: 70
End: 2025-04-04

## 2025-04-04 VITALS
BODY MASS INDEX: 35.56 KG/M2 | DIASTOLIC BLOOD PRESSURE: 88 MMHG | HEIGHT: 71 IN | HEART RATE: 84 BPM | SYSTOLIC BLOOD PRESSURE: 139 MMHG | WEIGHT: 254 LBS

## 2025-04-04 DIAGNOSIS — Z00.00 MEDICARE ANNUAL WELLNESS VISIT, SUBSEQUENT: Primary | ICD-10-CM

## 2025-04-04 DIAGNOSIS — F41.1 GENERALIZED ANXIETY DISORDER: ICD-10-CM

## 2025-04-04 DIAGNOSIS — Z12.11 COLON CANCER SCREENING: ICD-10-CM

## 2025-04-04 SDOH — ECONOMIC STABILITY: FOOD INSECURITY: WITHIN THE PAST 12 MONTHS, YOU WORRIED THAT YOUR FOOD WOULD RUN OUT BEFORE YOU GOT MONEY TO BUY MORE.: NEVER TRUE

## 2025-04-04 SDOH — ECONOMIC STABILITY: FOOD INSECURITY: WITHIN THE PAST 12 MONTHS, THE FOOD YOU BOUGHT JUST DIDN'T LAST AND YOU DIDN'T HAVE MONEY TO GET MORE.: NEVER TRUE

## 2025-04-04 ASSESSMENT — PATIENT HEALTH QUESTIONNAIRE - PHQ9
SUM OF ALL RESPONSES TO PHQ QUESTIONS 1-9: 0
SUM OF ALL RESPONSES TO PHQ QUESTIONS 1-9: 0
2. FEELING DOWN, DEPRESSED OR HOPELESS: NOT AT ALL
SUM OF ALL RESPONSES TO PHQ QUESTIONS 1-9: 0
1. LITTLE INTEREST OR PLEASURE IN DOING THINGS: NOT AT ALL
SUM OF ALL RESPONSES TO PHQ QUESTIONS 1-9: 0

## 2025-04-04 NOTE — PROGRESS NOTES
Medicare Annual Wellness Visit    Malvin Sheets is here for Medicare AWV and Mental Health Problem (F/u Anxiety. Only taking zoloft 25 mg prn. )    Assessment & Plan   Medicare annual wellness visit, subsequent  Colon cancer screening  -     Fecal DNA Colorectal cancer screening (Cologuard)  Generalized anxiety disorder    Plan:  1. Medicare annual wellness visit, subsequent  See concerns and discussion.    2. Colon cancer screening  Cologuard sent in.   - Fecal DNA Colorectal cancer screening (Cologuard)    3. Generalized anxiety disorder  Controlled on Zoloft.  No change in medication.          Malvin was instructed to follow up in the clinic in 6 months for check up or as needed with any medical issues.      Advance Care Planning     Advance Care Planning (ACP) Physician/NP/PA Conversation    Date of Conversation: 4/4/2025  Conducted with: Patient with Decision Making Capacity  Other persons present: Spouse Alaina    Healthcare Decision Maker:   Primary Decision Maker: Alaina Sheets - Spouse - [phone]    Secondary Decision Maker: Tenzin Sheets - Child - [phone]     Today we documented Decision Maker(s) consistent with Legal Next of Kin hierarchy.    Care Preferences:    Hospitalization:  \"If your health worsens and it becomes clear that your chance of recovery is unlikely, what would be your preference regarding hospitalization?\"  The patient would prefer hospitalization.    Ventilation:  \"If you were unable to breath on your own and your chance of recovery was unlikely, what would be your preference about the use of a ventilator (breathing machine) if it was available to you?\"  The patient would desire the use of a ventilator.    Resuscitation:  \"In the event your heart stopped as a result of an underlying serious health condition, would you want attempts made to restart your heart, or would you prefer a natural death?\"  Yes, attempt to resuscitate.    treatment goals and benefit/burden of treatment

## 2025-04-04 NOTE — PATIENT INSTRUCTIONS
Survey:     You may be receiving a survey from [employer] regarding your visit today.     You may get this in the mail, through your MyChart or in your email.      Please complete the survey to enable us to provide the highest quality of care to you and your family. Please also, mention our names.     If you cannot score us as very good (5 Stars) on any question, please feel free to call the office to discuss how we could have made your experience exceptional.      Thank You!        Dr. Rose, MD Clark, YINKA Gallardo, GALLITO Tello, FORTINO Mills, GALLITO Reese, GALLITO          Learning About Being Active as an Older Adult  Why is being active important as you get older?     Being active is one of the best things you can do for your health. And it's never too late to start. Being active--or getting active, if you aren't already--has definite benefits. It can:  Give you more energy,  Keep your mind sharp.  Improve balance to reduce your risk of falls.  Help you manage chronic illness with fewer medicines.  No matter how old you are, how fit you are, or what health problems you have, there is a form of activity that will work for you. And the more physical activity you can do, the better your overall health will be.  What kinds of activity can help you stay healthy?  Being more active will make your daily activities easier. Physical activity includes planned exercise and things you do in daily life. There are four types of activity:  Aerobic.  Doing aerobic activity makes your heart and lungs strong.  Includes walking, dancing, and gardening.  Aim for at least 2½ hours spread throughout the week.  It improves your energy and can help you sleep better.  Muscle-strengthening.  This type of activity can help maintain muscle and strengthen bones.  Includes climbing stairs, using resistance bands, and lifting or carrying heavy loads.  Aim for at least twice a week.  It can help protect the knees and other

## 2025-04-20 LAB — NONINV COLON CA DNA+OCC BLD SCRN STL QL: POSITIVE

## 2025-04-21 ENCOUNTER — RESULTS FOLLOW-UP (OUTPATIENT)
Dept: INTERNAL MEDICINE CLINIC | Age: 70
End: 2025-04-21

## 2025-04-21 DIAGNOSIS — K92.1 BLOOD IN STOOL: ICD-10-CM

## 2025-04-21 DIAGNOSIS — Z12.11 COLON CANCER SCREENING: ICD-10-CM

## 2025-04-24 ENCOUNTER — OFFICE VISIT (OUTPATIENT)
Dept: FAMILY MEDICINE CLINIC | Age: 70
End: 2025-04-24

## 2025-04-24 VITALS
DIASTOLIC BLOOD PRESSURE: 82 MMHG | SYSTOLIC BLOOD PRESSURE: 132 MMHG | BODY MASS INDEX: 36.45 KG/M2 | HEART RATE: 74 BPM | HEIGHT: 70 IN

## 2025-04-24 DIAGNOSIS — Z01.818 PRE-OP TESTING: ICD-10-CM

## 2025-04-24 DIAGNOSIS — Z12.11 COLON CANCER SCREENING: Primary | ICD-10-CM

## 2025-04-24 DIAGNOSIS — R19.5 POSITIVE COLORECTAL CANCER SCREENING USING COLOGUARD TEST: ICD-10-CM

## 2025-04-24 RX ORDER — SODIUM, POTASSIUM,MAG SULFATES 17.5-3.13G
SOLUTION, RECONSTITUTED, ORAL ORAL
Qty: 1 EACH | Refills: 0 | Status: SHIPPED | OUTPATIENT
Start: 2025-04-24

## 2025-04-24 ASSESSMENT — ENCOUNTER SYMPTOMS
CONSTIPATION: 0
RESPIRATORY NEGATIVE: 1
SORE THROAT: 0
NAUSEA: 0
BLOOD IN STOOL: 0
ABDOMINAL PAIN: 0
DIARRHEA: 0

## 2025-04-24 NOTE — PATIENT INSTRUCTIONS
Survey:     You may be receiving a survey from [facility] [employer] regarding your visit today.     You may get this in the mail, through your MyChart or in your email.      Please complete the survey to enable us to provide the highest quality of care to you and your family. Please also, mention our names.     If you cannot score us as very good (5 Stars) on any question, please feel free to call the office to discuss how we could have made your experience exceptional.      Thank You!        Dr. Rose, MD Clark, YINKA Gallardo, GALLITO Tello, APRN VICKIE Mills, GALLITO Reese, CMA       Assessment & Plan  Colon cancer screening   Set up for screening colonoscopy.  Risk and benefits of the procedure explained including risk for infection, bleeding, perforation, and death.  Verbal and written informed consent obtained.  The bowel prep was explained to Malvin and he was instructed to start the prep the day before the procedure (printed directions were given).  Avoid corn 1 week prior to the procedure as this can cause suctioning difficulties during the procedure.  Avoid eating or drinking at least 8 hours prior to the procedure.  Malvin was encouraged to call the clinic if he has any questions prior to the procedure.     Orders:    COLONOSCOPY W/ OR W/O BIOPSY; Future    sodium-potassium-mag sulfate (SUPREP BOWEL PREP KIT) 17.5-3.13-1.6 GM/177ML SOLN solution; Use as directed.    EKG 12 Lead; Future    Pre-op testing   ECG prior to the procedure.     Orders:    EKG 12 Lead; Future    Positive colorectal cancer screening using Cologuard test       Orders:    COLONOSCOPY W/ OR W/O BIOPSY; Future    sodium-potassium-mag sulfate (SUPREP BOWEL PREP KIT) 17.5-3.13-1.6 GM/177ML SOLN solution; Use as directed.      Follow up after the colonoscopy.

## 2025-04-24 NOTE — ASSESSMENT & PLAN NOTE
Set up for screening colonoscopy.  Risk and benefits of the procedure explained including risk for infection, bleeding, perforation, and death.  Verbal and written informed consent obtained.  The bowel prep was explained to Malvin and he was instructed to start the prep the day before the procedure (printed directions were given).  Avoid corn 1 week prior to the procedure as this can cause suctioning difficulties during the procedure.  Avoid eating or drinking at least 8 hours prior to the procedure.  Malvin was encouraged to call the clinic if he has any questions prior to the procedure.     Orders:    COLONOSCOPY W/ OR W/O BIOPSY; Future    sodium-potassium-mag sulfate (SUPREP BOWEL PREP KIT) 17.5-3.13-1.6 GM/177ML SOLN solution; Use as directed.    EKG 12 Lead; Future

## 2025-04-24 NOTE — PROGRESS NOTES
Malvin Sheets (:  1955) is a 69 y.o. male,Established patient, here for evaluation of the following chief complaint(s):  Surgical Consult (Colonoscopy consult. Scope scheduled for 25. Positive cologuard. )         Assessment & Plan  Colon cancer screening   Set up for screening colonoscopy.  Risk and benefits of the procedure explained including risk for infection, bleeding, perforation, and death.  Verbal and written informed consent obtained.  The bowel prep was explained to Malvin and he was instructed to start the prep the day before the procedure (printed directions were given).  Avoid corn 1 week prior to the procedure as this can cause suctioning difficulties during the procedure.  Avoid eating or drinking at least 8 hours prior to the procedure.  Malvin was encouraged to call the clinic if he has any questions prior to the procedure.     Orders:    COLONOSCOPY W/ OR W/O BIOPSY; Future    sodium-potassium-mag sulfate (SUPREP BOWEL PREP KIT) 17.5-3.13-1.6 GM/177ML SOLN solution; Use as directed.    EKG 12 Lead; Future    Pre-op testing   ECG prior to the procedure.     Orders:    EKG 12 Lead; Future    Positive colorectal cancer screening using Cologuard test       Orders:    COLONOSCOPY W/ OR W/O BIOPSY; Future    sodium-potassium-mag sulfate (SUPREP BOWEL PREP KIT) 17.5-3.13-1.6 GM/177ML SOLN solution; Use as directed.      Follow up after the colonoscopy.        Subjective   Malvin a patient of mine presents to be evaluated for a colonoscopy.  Malvin is 69 y.o. and has not had a colonoscopy in the past.   Currently Malvin denies rectal bleeding, denies bowel habit changes, and denies abdominal pain.  There is not a family history of colon cancer.     Past Medical History:  No date: Cardiac murmur unspecified  No date: Esophageal spasm      Comment:  age 35y    Past Surgical History:  No date: TONSILLECTOMY    Social History    Socioeconomic History      Marital status:

## 2025-04-25 ENCOUNTER — PREP FOR PROCEDURE (OUTPATIENT)
Dept: FAMILY MEDICINE CLINIC | Age: 70
End: 2025-04-25

## 2025-04-25 RX ORDER — SODIUM CHLORIDE 0.9 % (FLUSH) 0.9 %
5-40 SYRINGE (ML) INJECTION PRN
Status: CANCELLED | OUTPATIENT
Start: 2025-04-25

## 2025-04-25 RX ORDER — SODIUM CHLORIDE 9 MG/ML
25 INJECTION, SOLUTION INTRAVENOUS PRN
Status: CANCELLED | OUTPATIENT
Start: 2025-04-25

## 2025-04-25 RX ORDER — SODIUM CHLORIDE 0.9 % (FLUSH) 0.9 %
5-40 SYRINGE (ML) INJECTION EVERY 12 HOURS SCHEDULED
Status: CANCELLED | OUTPATIENT
Start: 2025-04-25

## 2025-04-25 RX ORDER — SODIUM CHLORIDE, SODIUM LACTATE, POTASSIUM CHLORIDE, CALCIUM CHLORIDE 600; 310; 30; 20 MG/100ML; MG/100ML; MG/100ML; MG/100ML
INJECTION, SOLUTION INTRAVENOUS CONTINUOUS
Status: CANCELLED | OUTPATIENT
Start: 2025-04-25

## 2025-04-25 NOTE — PROGRESS NOTES
[facility]   Preadmission Testing    Name: Malvin Sheets  : 1955  Patient Phone: [phone] (home)     Procedure: COLONOSCOPY  Date of Procedure: 2025  Surgeon: Hernan Rose MD    Ht:  177.8 cm (5' 10\")  Wt: 115.7 kg (255 lb)  Wt method: Stated    Allergies: No Known Allergies             There were no vitals filed for this visit.    No LMP for male patient.    Do you take blood thinners?   [] Yes    [x] No         Instructed to stop blood thinners prior to procedure?    [] Yes    [] No      [x] N/A   Do you have sleep apnea?   [] Yes    [x] No     Do you have acid reflux ?   [x] Yes    [] No     Do you have  hiatal hernia?   [] Yes    [x] No    Do you ever experience motion sickness?   [] Yes    [x] No     Have you had a respiratory infection or sore throat in last 4 weeks before surgery?    [] Yes    [x] No     Do you have poorly controlled asthma or COPD?  Difficulty with intubation in past? [] Yes    [x] No      [] Yes    [x] No       Do you have a history of angina in the last month or symptomatic arrhythmia?   [] Yes    [x] No     Do you have significant central nervous system disease?   [] Yes    [x] No     Have you had an EKG, labs, or chest xray in last 12 months?  If yes provide copies to anesthesia   [] Yes    [x] No       [] Lab    [x] EKG    [] CXR  PATIENT AWARE HE NEEDS IT   Have you had a stress test?     [] Yes    [x] No    When/where:    Was it normal?    [] Yes    [] No     Do you or your family have a history of Malignant Hyperthermia?   [] Yes    [x] No           Do you smoke? [] Yes    [x] No      Please refrain from smoking on the day of surgery.      Patient instructed on: [x] NPO Status   [x] Meds to Take  [] Hold GLP-1 Receptor Agonist  [x] Ride Home  [] No Jewelry/Contact Lenses/Nail Polish  [x] Prep/Lax/Clear Liquids    [] Chlorhexidene     DOS Patient Needs [] HCG   [] Blood Sugar  [] PT/INR    [] T&S       Do you have any metal allergies? [] Yes    [x] No       If yes, to what metals:                   Patient instructed on the pre-operative, intra-operative, and post-operative process?   Yes  Medication instructions reviewed with patient?  Yes

## 2025-04-28 ENCOUNTER — HOSPITAL ENCOUNTER (OUTPATIENT)
Dept: NON INVASIVE DIAGNOSTICS | Age: 70
Discharge: HOME | End: 2025-04-28
Payer: MEDICARE

## 2025-04-28 DIAGNOSIS — Z01.818 PRE-OP TESTING: ICD-10-CM

## 2025-04-28 DIAGNOSIS — Z12.11 COLON CANCER SCREENING: ICD-10-CM

## 2025-04-28 PROCEDURE — 93005 ELECTROCARDIOGRAM TRACING: CPT

## 2025-04-29 ENCOUNTER — ANESTHESIA EVENT (OUTPATIENT)
Dept: ENDOSCOPY | Age: 70
End: 2025-04-29
Payer: MEDICARE

## 2025-04-29 ENCOUNTER — RESULTS FOLLOW-UP (OUTPATIENT)
Dept: INTERNAL MEDICINE CLINIC | Age: 70
End: 2025-04-29

## 2025-04-29 LAB
EKG ATRIAL RATE: 75 BPM
EKG P AXIS: 68 DEGREES
EKG P-R INTERVAL: 160 MS
EKG Q-T INTERVAL: 388 MS
EKG QRS DURATION: 102 MS
EKG QTC CALCULATION (BAZETT): 433 MS
EKG R AXIS: 60 DEGREES
EKG T AXIS: 45 DEGREES
EKG VENTRICULAR RATE: 75 BPM

## 2025-04-29 PROCEDURE — 93010 ELECTROCARDIOGRAM REPORT: CPT | Performed by: INTERNAL MEDICINE

## 2025-05-01 ENCOUNTER — ANESTHESIA (OUTPATIENT)
Dept: ENDOSCOPY | Age: 70
End: 2025-05-01
Payer: MEDICARE

## 2025-05-01 ENCOUNTER — HOSPITAL ENCOUNTER (OUTPATIENT)
Age: 70
Setting detail: OUTPATIENT SURGERY
Discharge: HOME | End: 2025-05-01
Attending: INTERNAL MEDICINE | Admitting: INTERNAL MEDICINE
Payer: MEDICARE

## 2025-05-01 VITALS
SYSTOLIC BLOOD PRESSURE: 106 MMHG | HEIGHT: 70 IN | TEMPERATURE: 97.3 F | WEIGHT: 246.9 LBS | BODY MASS INDEX: 35.35 KG/M2 | RESPIRATION RATE: 12 BRPM | DIASTOLIC BLOOD PRESSURE: 70 MMHG | OXYGEN SATURATION: 97 % | HEART RATE: 66 BPM

## 2025-05-01 DIAGNOSIS — K92.1 BLOOD IN STOOL: ICD-10-CM

## 2025-05-01 DIAGNOSIS — Z12.11 COLON CANCER SCREENING: ICD-10-CM

## 2025-05-01 PROCEDURE — 88305 TISSUE EXAM BY PATHOLOGIST: CPT

## 2025-05-01 PROCEDURE — 3700000001 HC ADD 15 MINUTES (ANESTHESIA): Performed by: INTERNAL MEDICINE

## 2025-05-01 PROCEDURE — 2580000003 HC RX 258: Performed by: INTERNAL MEDICINE

## 2025-05-01 PROCEDURE — 2709999900 HC NON-CHARGEABLE SUPPLY: Performed by: INTERNAL MEDICINE

## 2025-05-01 PROCEDURE — 6360000002 HC RX W HCPCS: Performed by: NURSE ANESTHETIST, CERTIFIED REGISTERED

## 2025-05-01 PROCEDURE — 7100000011 HC PHASE II RECOVERY - ADDTL 15 MIN: Performed by: INTERNAL MEDICINE

## 2025-05-01 PROCEDURE — 7100000010 HC PHASE II RECOVERY - FIRST 15 MIN: Performed by: INTERNAL MEDICINE

## 2025-05-01 PROCEDURE — 3609010300 HC COLONOSCOPY W/BIOPSY SINGLE/MULTIPLE: Performed by: INTERNAL MEDICINE

## 2025-05-01 PROCEDURE — 3700000000 HC ANESTHESIA ATTENDED CARE: Performed by: INTERNAL MEDICINE

## 2025-05-01 RX ORDER — SODIUM CHLORIDE 9 MG/ML
25 INJECTION, SOLUTION INTRAVENOUS PRN
Status: DISCONTINUED | OUTPATIENT
Start: 2025-05-01 | End: 2025-05-01 | Stop reason: HOSPADM

## 2025-05-01 RX ORDER — SODIUM CHLORIDE 0.9 % (FLUSH) 0.9 %
5-40 SYRINGE (ML) INJECTION EVERY 12 HOURS SCHEDULED
Status: DISCONTINUED | OUTPATIENT
Start: 2025-05-01 | End: 2025-05-01 | Stop reason: HOSPADM

## 2025-05-01 RX ORDER — SODIUM CHLORIDE 0.9 % (FLUSH) 0.9 %
5-40 SYRINGE (ML) INJECTION PRN
Status: DISCONTINUED | OUTPATIENT
Start: 2025-05-01 | End: 2025-05-01 | Stop reason: HOSPADM

## 2025-05-01 RX ORDER — PROPOFOL 10 MG/ML
INJECTION, EMULSION INTRAVENOUS
Status: DISCONTINUED | OUTPATIENT
Start: 2025-05-01 | End: 2025-05-01 | Stop reason: SDUPTHER

## 2025-05-01 RX ORDER — FENTANYL CITRATE 50 UG/ML
INJECTION, SOLUTION INTRAMUSCULAR; INTRAVENOUS
Status: DISCONTINUED | OUTPATIENT
Start: 2025-05-01 | End: 2025-05-01 | Stop reason: SDUPTHER

## 2025-05-01 RX ORDER — SODIUM CHLORIDE, SODIUM LACTATE, POTASSIUM CHLORIDE, CALCIUM CHLORIDE 600; 310; 30; 20 MG/100ML; MG/100ML; MG/100ML; MG/100ML
INJECTION, SOLUTION INTRAVENOUS CONTINUOUS
Status: DISCONTINUED | OUTPATIENT
Start: 2025-05-01 | End: 2025-05-01 | Stop reason: HOSPADM

## 2025-05-01 RX ORDER — LIDOCAINE HYDROCHLORIDE 10 MG/ML
INJECTION, SOLUTION EPIDURAL; INFILTRATION; INTRACAUDAL; PERINEURAL
Status: DISCONTINUED | OUTPATIENT
Start: 2025-05-01 | End: 2025-05-01 | Stop reason: SDUPTHER

## 2025-05-01 RX ORDER — MIDAZOLAM HYDROCHLORIDE 1 MG/ML
INJECTION, SOLUTION INTRAMUSCULAR; INTRAVENOUS
Status: DISCONTINUED | OUTPATIENT
Start: 2025-05-01 | End: 2025-05-01 | Stop reason: SDUPTHER

## 2025-05-01 RX ADMIN — PROPOFOL 75 MCG/KG/MIN: 10 INJECTION, EMULSION INTRAVENOUS at 07:12

## 2025-05-01 RX ADMIN — FENTANYL CITRATE 50 MCG: 50 INJECTION, SOLUTION INTRAMUSCULAR; INTRAVENOUS at 07:12

## 2025-05-01 RX ADMIN — FENTANYL CITRATE 50 MCG: 50 INJECTION, SOLUTION INTRAMUSCULAR; INTRAVENOUS at 07:08

## 2025-05-01 RX ADMIN — LIDOCAINE HYDROCHLORIDE 50 MG: 10 INJECTION, SOLUTION EPIDURAL; INFILTRATION; INTRACAUDAL; PERINEURAL at 07:12

## 2025-05-01 RX ADMIN — MIDAZOLAM 2 MG: 1 INJECTION INTRAMUSCULAR; INTRAVENOUS at 07:08

## 2025-05-01 RX ADMIN — SODIUM CHLORIDE, SODIUM LACTATE, POTASSIUM CHLORIDE, AND CALCIUM CHLORIDE: .6; .31; .03; .02 INJECTION, SOLUTION INTRAVENOUS at 06:49

## 2025-05-01 RX ADMIN — PROPOFOL 120 MG: 10 INJECTION, EMULSION INTRAVENOUS at 07:12

## 2025-05-01 ASSESSMENT — PAIN - FUNCTIONAL ASSESSMENT: PAIN_FUNCTIONAL_ASSESSMENT: 0-10

## 2025-05-01 NOTE — ANESTHESIA POSTPROCEDURE EVALUATION
Department of Anesthesiology  Postprocedure Note    Patient: Malvin Sheets  MRN: [id number]  YOB: 1955  Date of evaluation: 5/1/2025    Procedure Summary       Date: 05/01/25 Room / Location: [place] [address] / [facility] ENDOSCOPY    Anesthesia Start: 0709 Anesthesia Stop: 0750    Procedure: COLONOSCOPY (Abdomen) Diagnosis:       Colon cancer screening      Blood in stool      (Colon cancer screening [Z12.11])      (Blood in stool [K92.1])    Surgeons: Hernan Rose MD Responsible Provider: Bebeto Aquino APRN - CRNA    Anesthesia Type: MAC ASA Status: 2            Anesthesia Type: No value filed.    Abilio Phase I: Abilio Score: 10    Abilio Phase II: Abilio Score: 10    Anesthesia Post Evaluation    Patient location during evaluation: PACU  Patient participation: complete - patient participated  Level of consciousness: awake and lethargic  Pain score: 0  Airway patency: patent  Nausea & Vomiting: no nausea and no vomiting  Cardiovascular status: hemodynamically stable  Respiratory status: acceptable, nonlabored ventilation, room air and spontaneous ventilation  Hydration status: euvolemic  Multimodal analgesia pain management approach  Pain management: adequate and satisfactory to patient    No notable events documented.

## 2025-05-01 NOTE — BRIEF OP NOTE
Brief Postoperative Note      Patient: Malvin Sheets  YOB: 1955  MRN: [id number]    Date of Procedure: 5/1/2025    Pre-Op Diagnosis Codes:      * Colon cancer screening [Z12.11]     * Blood in stool [K92.1]    Post-Op Diagnosis:  Colon cancer screening.     Rectal polyps x 2.     Sigmoid diverticulosis.       Procedure(s):  Colonoscopy to the Cecum.  Rectal polypectomy x 2 with cold cup biopsy forceps.     Surgeon(s):  Hernan Rose MD    Assistant:  Bijal Perea CST    Anesthesia: Bebeto Aquino CRNA.  MAC anesthesia     Estimated Blood Loss (mL): < 1 ml    Complications: None    Specimens:   ID Type Source Tests Collected by Time Destination   A : A. Rectal polyp Tissue Rectum SURGICAL PATHOLOGY Hernan Rose MD 5/1/2025 0742    B : B. Rectal polyp Tissue Rectum SURGICAL PATHOLOGY Hernan Rose MD 5/1/2025 0745        Implants:  * No implants in log *      Drains: * No LDAs found *        Electronically signed by Hernan Rose MD on 5/1/2025 at 7:50 AM

## 2025-05-01 NOTE — PROCEDURES
[facility]                [address] [place] [address]                               COLONOSCOPY      PATIENT NAME: FIOR FARRELL               : 1955  MED REC NO: [id number]                          ROOM: [facility]  ACCOUNT NO: [id number]                       ADMIT DATE: 2025  PROVIDER: Hernan Rose MD      DATE OF PROCEDURE: 2025    SURGEON:  Hernan Rose MD    PRIMARY CARE PHYSICIAN:  Hernan Rose MD    PROCEDURES:    1. Video-assisted colonoscopy to the cecum.  2. Rectal polypectomy x2 with cold cup biopsy forceps.    PREOPERATIVE DIAGNOSIS:  Colon cancer screening.    POSTOPERATIVE DIAGNOSES:    1. Colon cancer screening.  2. Rectal polyps x2.  3. Sigmoid diverticulosis.    ANESTHESIA:  Per Chris Mera CRNA, MAC anesthesia.    ASSISTANT:  Bijal Perea CST.    COMPLICATIONS:  None.    ESTIMATED BLOOD LOSS:  Less than 1 mL.    DESCRIPTION OF PROCEDURE:  The patient was brought to the operating room, where procedure was explained along with possible complications and informed consent was obtained.  He was then taken to the minor procedure room, where continuous cardiopulmonary monitors were placed along with O2 via simple mask.  The patient was then placed flat in bed, positioned on his left side, made comfortable and IV sedation was given per Chris Mera CRNA.  Once the patient was adequately sedated, a digital rectal exam was performed.  Prostate was fairly normal in size.  No masses were palpated.  The colonoscope was then inserted into the rectum with CO2 insufflation being performed.  The scope was slowly advanced up the sigmoid colon, where occasional diverticula were noted.  None noted to be actively inflamed or bleeding.  Colonoscope was then advanced to the descending colon past the splenic flexure into the transverse colon.  Upon first pass to the descending and transverse colon, no abnormalities were noted.  Colonoscope was  then advanced past the hepatic flexure, down the ascending colon into the cecum, where photodocumentation of the cecum as well as the ileocecal valve was taken.  The colonoscope was advanced into the 1st portion of the ileum, which appeared normal.  Photodocumentation was taken.  Colonoscope was then withdrawn back up through the ascending colon, past hepatic flexure into the transverse colon.  Upon 2nd pass to the ascending and transverse colon, no abnormalities were seen.  Colonoscope was withdrawn past the splenic flexure down the descending colon and back to the sigmoid colon, where again occasional diverticula were noted.  None that looked to be actively inflamed or bleeding.  Photodocumentation was taken.  Colonoscope was then withdrawn back into the rectum where 2 sessile polyps were identified approximately 5 to 6 mm in size.  Each of these polyps were removed with cold cup biopsy forceps with good hemostasis obtained post polypectomy.    Photodocumentation was taken post polypectomy.  While in the rectum, the colonoscope was retroflexed upon itself.  In the retroflexed position, no abnormalities were seen.  Photodocumentation was taken.  Colonoscope was then straightened.  Suction was applied to remove excess air, and the colonoscope was withdrawn.  The patient tolerated the procedure well without complications, was taken to recovery room for further monitoring.  He will be instructed to follow up with Dr. Rose in 2 weeks to follow up with the pathology of the polyps.          LOUISA ROSE MD      D:  05/01/2025 07:56:18     T:  05/01/2025 08:12:12     PRASANTH/ASAF  Job #:  [id number]     Doc#:  [id number]

## 2025-05-01 NOTE — H&P
History and Physical       Malvin Sheets (:  1955) is a 69 y.o. male,Established patient, here for evaluation of the following chief complaint(s):  Surgical Consult (Colonoscopy consult. Scope scheduled for 25. Positive cologuard. )        Assessment & Plan  Colon cancer screening   Set up for screening colonoscopy.  Risk and benefits of the procedure explained including risk for infection, bleeding, perforation, and death.  Verbal and written informed consent obtained.  The bowel prep was explained to Malvin and he was instructed to start the prep the day before the procedure (printed directions were given).  Avoid corn 1 week prior to the procedure as this can cause suctioning difficulties during the procedure.  Avoid eating or drinking at least 8 hours prior to the procedure.  Malvin was encouraged to call the clinic if he has any questions prior to the procedure.      Orders:    COLONOSCOPY W/ OR W/O BIOPSY; Future    sodium-potassium-mag sulfate (SUPREP BOWEL PREP KIT) 17.5-3.13-1.6 GM/177ML SOLN solution; Use as directed.    EKG 12 Lead; Future     Pre-op testing   ECG prior to the procedure.      Orders:    EKG 12 Lead; Future     Positive colorectal cancer screening using Cologuard test        Orders:    COLONOSCOPY W/ OR W/O BIOPSY; Future    sodium-potassium-mag sulfate (SUPREP BOWEL PREP KIT) 17.5-3.13-1.6 GM/177ML SOLN solution; Use as directed.        Follow up after the colonoscopy.      Subjective  Malvin a patient of mine presents to be evaluated for a colonoscopy.  Malvin is 69 y.o. and has not had a colonoscopy in the past.   Currently Malvin denies rectal bleeding, denies bowel habit changes, and denies abdominal pain.  There is not a family history of colon cancer.     No current facility-administered medications on file prior to encounter.     Current Outpatient Medications on File Prior to Encounter   Medication Sig Dispense Refill    Omega-3 Fatty Acids (FISH OIL PO)

## 2025-05-01 NOTE — ANESTHESIA PRE PROCEDURE
Department of Anesthesiology  Preprocedure Note       Name:  Malvin Sheets   Age:  69 y.o.  :  1955                                          MRN:  [id number]         Date:  2025      Surgeon: Surgeon(s):  Hernan Rose MD    Procedure: Procedure(s):  COLONOSCOPY    Medications prior to admission:   Prior to Admission medications    Medication Sig Start Date End Date Taking? Authorizing Provider   Omega-3 Fatty Acids (FISH OIL PO) Take by mouth   Yes Erika Jamil MD   VITAMIN E PO Take by mouth   Yes Erika Jamil MD   ZINC PO Take by mouth   Yes Erika Jamil MD   Cyanocobalamin (B-12 PO) Take by mouth   Yes Erika Jamil MD   VITAMIN D PO Take by mouth   Yes Erika Jamil MD   Potassium (POTASSIMIN PO) Take by mouth daily   Yes Erika Jamil MD   therapeutic multivitamin-minerals (THERAGRAN-M) tablet Take 1 tablet by mouth daily   Yes Erika Jamil MD   Ascorbic Acid (VITAMIN C) 500 MG tablet Take 1 tablet by mouth daily   Yes Erika Jamil MD   sodium-potassium-mag sulfate (SUPREP BOWEL PREP KIT) 17.5-3.13-1.6 GM/177ML SOLN solution Use as directed. 25   Hernan Rose MD   sertraline (ZOLOFT) 25 MG tablet TAKE 1 TABLET BY MOUTH DAILY 24   Hernan Rose MD   aspirin EC 81 MG EC tablet Take 1 tablet by mouth daily 7/27/15   Hernan Rose MD   Boswellia-Glucosamine-Vit D (GLUCOSAMINE COMPLEX PO) Take  by mouth.    Erika Jamil MD       Current medications:    Current Facility-Administered Medications   Medication Dose Route Frequency Provider Last Rate Last Admin    lactated ringers infusion   IntraVENous Continuous Hernan Rose MD 75 mL/hr at 25 0649 New Bag at 25 0649    sodium chloride flush 0.9 % injection 5-40 mL  5-40 mL IntraVENous 2 times per day Hernan Rose MD        sodium chloride flush 0.9 % injection 5-40 mL  5-40 mL IntraVENous PRN Hernan Rose MD        0.9 % sodium chloride infusion  25 mL

## 2025-05-02 LAB — SURGICAL PATHOLOGY REPORT: NORMAL

## 2025-05-21 PROBLEM — Z12.11 COLON CANCER SCREENING: Status: RESOLVED | Noted: 2025-04-21 | Resolved: 2025-05-21

## 2025-05-27 ENCOUNTER — OFFICE VISIT (OUTPATIENT)
Dept: FAMILY MEDICINE CLINIC | Age: 70
End: 2025-05-27

## 2025-05-27 VITALS
WEIGHT: 255 LBS | HEART RATE: 76 BPM | HEIGHT: 70 IN | BODY MASS INDEX: 36.51 KG/M2 | DIASTOLIC BLOOD PRESSURE: 80 MMHG | SYSTOLIC BLOOD PRESSURE: 134 MMHG

## 2025-05-27 DIAGNOSIS — R94.31 ABNORMAL ECG: ICD-10-CM

## 2025-05-27 DIAGNOSIS — K62.1 HYPERPLASTIC RECTAL POLYP: Primary | ICD-10-CM

## 2025-05-27 ASSESSMENT — ENCOUNTER SYMPTOMS
SORE THROAT: 0
CONSTIPATION: 0
DIARRHEA: 0
NAUSEA: 0
BLOOD IN STOOL: 0
RESPIRATORY NEGATIVE: 1
ABDOMINAL PAIN: 0

## 2025-05-27 NOTE — PATIENT INSTRUCTIONS
Survey:     You may be receiving a survey from [employer] regarding your visit today.     You may get this in the mail, through your MyChart or in your email.      Please complete the survey to enable us to provide the highest quality of care to you and your family. Please also, mention our names.     If you cannot score us as very good (5 Stars) on any question, please feel free to call the office to discuss how we could have made your experience exceptional.      Thank You!        Dr. Rose, MD Clark, YINKA Gallardo, GALLITO Tello, APRN VICKIE Mills, GALLITO Reese, CMA       Assessment & Plan  Hyperplastic rectal polyp  Based on the current guidelines for Colonoscopy, a repeat colonoscopy is recommended in 10  years.     [place] multi-society task force recommendations for post-colonoscopy follow-up in average-risk adults with normal colonoscopy or adenomas*  Baseline colonoscopy finding Recommended interval for surveillance colonoscopy Strength of recommendation Quality of evidence   Normal 10 years¶ Strong High   1 to 2 tubular adenomas <10 mm 7 to 10 years? Strong Moderate   3 to 4 tubular adenomas <10 mm 3 to 5 years Weak Very low   5 to 10 tubular adenomas <10 mm 3 years Strong Moderate   Adenoma >=10 mm 3 years Strong High   Adenoma with tubulovillous or villous histology 3 years? Strong Moderate   Adenoma with high-grade dysplasia 3 years? Strong Moderate   >10 adenomas on single examination§ 1 year Weak Very low   Piecemeal resection of adenoma >=20 mm 6 months Strong Moderate¥   CRC: colorectal cancer.  * All recommendations assume examination complete to cecum with bowel preparation adequate to detect lesions >5 mm in size; recommendations do not apply to individuals with a hereditary CRC syndrome, personal history of inflammatory bowel disease, personal history of hereditary cancer syndrome, serrated polyposis syndrome, malignant polyp, personal history of CRC, or family history of CRC, and must be

## 2025-05-27 NOTE — PROGRESS NOTES
feasible, physicians may re-evaluate patients previously recommended an interval shorter than 10 years and reasonably choose to provide an updated recommendation for 7- to 10-year follow-up, taking into account factors such as quality of baseline examination, polyp history, and patient preferences.  ? Assumes high confidence of complete resection.  § Patients with >10 adenomas or lifetime >10 cumulative adenomas may need to be considered for genetic testing based on absolute/cumulative adenoma number, patient age, and other factors such as family history of CRC (refer to UpToDate text).  ¥ Refer to [place] Multi-Society Task Force recommendations for endoscopic removal of colorectal lesions.          Abnormal ECG   Poor R wave progression in the anterior leads.  Likely secondary to lead placement as he has not had any symptoms and keeps very busy farming.  His previous ECG was identical except V3.    Discussed repeating ECG vs ECHO.  Would like to continue to monitor at this time.   Will continue to monitor.         Follow up on next scheduled appt or as needed.            Subjective   Malvin presents to the clinic after undergoing a colonoscopy.  aMlvin denies problems after the colonoscopy.  The colonoscopy results along with pathology, if present, was reviewed with Malvin.  There was polys present.  Both polyps were benign.  The next colonoscopy is recommended for 10 years unless further symptoms develop.         Review of Systems   Constitutional: Negative.    HENT:  Negative for congestion, ear pain, postnasal drip, sneezing and sore throat.    Eyes:  Negative for visual disturbance.   Respiratory: Negative.     Cardiovascular:  Negative for chest pain, palpitations and leg swelling.   Gastrointestinal:  Negative for abdominal pain, blood in stool, constipation, diarrhea and nausea.   Genitourinary:  Negative for difficulty urinating, dysuria, frequency and urgency.   Musculoskeletal:  Negative for arthralgias,

## (undated) DEVICE — SYRINGE MED 50ML LUERSLIP TIP

## (undated) DEVICE — 4-PORT MANIFOLD: Brand: NEPTUNE 2

## (undated) DEVICE — FORCEPS BX JUMBO L240CM DIA2.8MM WRK CHN 3.2MM ORNG W/ NDL

## (undated) DEVICE — Device: Brand: DEFENDO VALVE AND CONNECTOR KIT

## (undated) DEVICE — SOLUTION IRRIG 1000ML H2O PIC PLAS SHATTERPROOF CONTAINER

## (undated) DEVICE — ENDO KIT W/SYRINGE: Brand: MEDLINE INDUSTRIES, INC.

## (undated) DEVICE — JELLY LUBRICATING 4OZ FLIP TOP TB E Z